# Patient Record
Sex: MALE | Race: BLACK OR AFRICAN AMERICAN | URBAN - METROPOLITAN AREA
[De-identification: names, ages, dates, MRNs, and addresses within clinical notes are randomized per-mention and may not be internally consistent; named-entity substitution may affect disease eponyms.]

---

## 2018-11-29 ENCOUNTER — HISTORICAL (OUTPATIENT)
Dept: ADMINISTRATIVE | Facility: HOSPITAL | Age: 83
End: 2018-11-29

## 2018-12-05 LAB
FINAL CULTURE: NORMAL

## 2020-02-28 ENCOUNTER — HISTORICAL (OUTPATIENT)
Dept: ADMINISTRATIVE | Facility: HOSPITAL | Age: 85
End: 2020-02-28

## 2020-03-01 LAB — FINAL CULTURE: NORMAL

## 2020-06-05 ENCOUNTER — HISTORICAL (OUTPATIENT)
Dept: ADMINISTRATIVE | Facility: HOSPITAL | Age: 85
End: 2020-06-05

## 2020-06-08 LAB — FINAL CULTURE: NORMAL

## 2022-04-07 ENCOUNTER — HISTORICAL (OUTPATIENT)
Dept: ADMINISTRATIVE | Facility: HOSPITAL | Age: 87
End: 2022-04-07

## 2022-04-24 VITALS — DIASTOLIC BLOOD PRESSURE: 97 MMHG | SYSTOLIC BLOOD PRESSURE: 181 MMHG

## 2025-03-18 ENCOUNTER — HOSPITAL ENCOUNTER (INPATIENT)
Facility: HOSPITAL | Age: 89
LOS: 7 days | Discharge: HOSPICE | DRG: 065 | End: 2025-03-25
Attending: EMERGENCY MEDICINE | Admitting: INTERNAL MEDICINE
Payer: MEDICARE

## 2025-03-18 DIAGNOSIS — I16.0 HYPERTENSIVE URGENCY: ICD-10-CM

## 2025-03-18 DIAGNOSIS — F05 DELIRIUM SUPERIMPOSED ON DEMENTIA: ICD-10-CM

## 2025-03-18 DIAGNOSIS — I61.9 INTRAPARENCHYMAL HEMORRHAGE OF BRAIN: ICD-10-CM

## 2025-03-18 DIAGNOSIS — R53.1 GENERALIZED WEAKNESS: ICD-10-CM

## 2025-03-18 DIAGNOSIS — I67.4 HYPERTENSIVE ENCEPHALOPATHY: Primary | ICD-10-CM

## 2025-03-18 PROCEDURE — 96374 THER/PROPH/DIAG INJ IV PUSH: CPT

## 2025-03-18 PROCEDURE — 25000003 PHARM REV CODE 250: Performed by: INTERNAL MEDICINE

## 2025-03-18 PROCEDURE — 99285 EMERGENCY DEPT VISIT HI MDM: CPT | Mod: 25

## 2025-03-18 PROCEDURE — 11000001 HC ACUTE MED/SURG PRIVATE ROOM

## 2025-03-18 PROCEDURE — 63600175 PHARM REV CODE 636 W HCPCS: Performed by: EMERGENCY MEDICINE

## 2025-03-18 RX ORDER — SODIUM CHLORIDE 9 MG/ML
INJECTION, SOLUTION INTRAVENOUS CONTINUOUS
Status: DISCONTINUED | OUTPATIENT
Start: 2025-03-18 | End: 2025-03-21

## 2025-03-18 RX ORDER — NICARDIPINE HYDROCHLORIDE 0.2 MG/ML
0-15 INJECTION INTRAVENOUS CONTINUOUS
Status: DISCONTINUED | OUTPATIENT
Start: 2025-03-18 | End: 2025-03-21 | Stop reason: HOSPADM

## 2025-03-18 RX ORDER — LABETALOL HYDROCHLORIDE 5 MG/ML
10 INJECTION, SOLUTION INTRAVENOUS
Status: COMPLETED | OUTPATIENT
Start: 2025-03-18 | End: 2025-03-18

## 2025-03-18 RX ADMIN — SODIUM CHLORIDE: 9 INJECTION, SOLUTION INTRAVENOUS at 11:03

## 2025-03-18 RX ADMIN — NICARDIPINE HYDROCHLORIDE 2.5 MG/HR: 0.2 INJECTION, SOLUTION INTRAVENOUS at 11:03

## 2025-03-18 RX ADMIN — LABETALOL HYDROCHLORIDE 10 MG: 5 INJECTION, SOLUTION INTRAVENOUS at 10:03

## 2025-03-18 NOTE — Clinical Note
Diagnosis: Hypertensive encephalopathy [437.2.ICD-9-CM]   Future Attending Provider: ANEUDY VERDUZCO JR. [[id number]]   Admit to which facility:: [facility] [[id number]]   Reason for IP Medical Treatment  (Clinical interventions that can only be accomplished in the IP setting? ) :: IV BP control   Plans for Post-Acute care--if anticipated (pick the single best option):: A. No post acute care anticipated at this time

## 2025-03-19 LAB
ALBUMIN SERPL-MCNC: 3.5 G/DL (ref 3.4–4.8)
ALBUMIN/GLOB SERPL: 1.1 RATIO (ref 1.1–2)
ALP SERPL-CCNC: 78 UNIT/L (ref 40–150)
ALT SERPL-CCNC: 11 UNIT/L (ref 0–55)
ANION GAP SERPL CALC-SCNC: 16 MEQ/L
APTT PPP: 26.3 SECONDS (ref 23.2–33.7)
AST SERPL-CCNC: 36 UNIT/L (ref 5–34)
BASOPHILS # BLD AUTO: 0.03 X10(3)/MCL
BASOPHILS NFR BLD AUTO: 0.4 %
BILIRUB SERPL-MCNC: 1.1 MG/DL
BUN SERPL-MCNC: 13 MG/DL (ref 8.4–25.7)
CALCIUM SERPL-MCNC: 8.5 MG/DL (ref 8.8–10)
CHLORIDE SERPL-SCNC: 107 MMOL/L (ref 98–107)
CO2 SERPL-SCNC: 19 MMOL/L (ref 23–31)
CREAT SERPL-MCNC: 0.86 MG/DL (ref 0.72–1.25)
CREAT/UREA NIT SERPL: 15
CRP SERPL-MCNC: 1.2 MG/L
EOSINOPHIL # BLD AUTO: 0 X10(3)/MCL (ref 0–0.9)
EOSINOPHIL NFR BLD AUTO: 0 %
ERYTHROCYTE [DISTWIDTH] IN BLOOD BY AUTOMATED COUNT: 15.8 % (ref 11.5–17)
ERYTHROCYTE [SEDIMENTATION RATE] IN BLOOD: 20 MM/HR (ref 0–20)
GFR SERPLBLD CREATININE-BSD FMLA CKD-EPI: >60 ML/MIN/1.73/M2
GLOBULIN SER-MCNC: 3.2 GM/DL (ref 2.4–3.5)
GLUCOSE SERPL-MCNC: 81 MG/DL (ref 82–115)
HCT VFR BLD AUTO: 41.6 % (ref 42–52)
HGB BLD-MCNC: 13.3 G/DL (ref 14–18)
HIV 1+2 AB+HIV1 P24 AG SERPL QL IA: NONREACTIVE
IMM GRANULOCYTES # BLD AUTO: 0.01 X10(3)/MCL (ref 0–0.04)
IMM GRANULOCYTES NFR BLD AUTO: 0.1 %
INR PPP: 1.1
LYMPHOCYTES # BLD AUTO: 0.7 X10(3)/MCL (ref 0.6–4.6)
LYMPHOCYTES NFR BLD AUTO: 10.3 %
MAGNESIUM SERPL-MCNC: 2 MG/DL (ref 1.6–2.6)
MCH RBC QN AUTO: 29.6 PG (ref 27–31)
MCHC RBC AUTO-ENTMCNC: 32 G/DL (ref 33–36)
MCV RBC AUTO: 92.7 FL (ref 80–94)
MONOCYTES # BLD AUTO: 0.31 X10(3)/MCL (ref 0.1–1.3)
MONOCYTES NFR BLD AUTO: 4.6 %
NEUTROPHILS # BLD AUTO: 5.72 X10(3)/MCL (ref 2.1–9.2)
NEUTROPHILS NFR BLD AUTO: 84.6 %
NRBC BLD AUTO-RTO: 0 %
OHS QRS DURATION: 76 MS
OHS QTC CALCULATION: 477 MS
PHOSPHATE SERPL-MCNC: 2.2 MG/DL (ref 2.3–4.7)
PLATELET # BLD AUTO: 123 X10(3)/MCL (ref 130–400)
PMV BLD AUTO: 12.7 FL (ref 7.4–10.4)
POTASSIUM SERPL-SCNC: 3.5 MMOL/L (ref 3.5–5.1)
PROT SERPL-MCNC: 6.7 GM/DL (ref 5.8–7.6)
PROTHROMBIN TIME: 14 SECONDS (ref 12.5–14.5)
RBC # BLD AUTO: 4.49 X10(6)/MCL (ref 4.7–6.1)
SODIUM SERPL-SCNC: 142 MMOL/L (ref 136–145)
T PALLIDUM AB SER QL: NONREACTIVE
TSH SERPL-ACNC: 3.4 UIU/ML (ref 0.35–4.94)
WBC # BLD AUTO: 6.77 X10(3)/MCL (ref 4.5–11.5)

## 2025-03-19 PROCEDURE — 83735 ASSAY OF MAGNESIUM: CPT

## 2025-03-19 PROCEDURE — 87389 HIV-1 AG W/HIV-1&-2 AB AG IA: CPT

## 2025-03-19 PROCEDURE — 85730 THROMBOPLASTIN TIME PARTIAL: CPT

## 2025-03-19 PROCEDURE — 25000003 PHARM REV CODE 250

## 2025-03-19 PROCEDURE — 36415 COLL VENOUS BLD VENIPUNCTURE: CPT

## 2025-03-19 PROCEDURE — 84443 ASSAY THYROID STIM HORMONE: CPT

## 2025-03-19 PROCEDURE — 11000001 HC ACUTE MED/SURG PRIVATE ROOM

## 2025-03-19 PROCEDURE — 20000000 HC ICU ROOM

## 2025-03-19 PROCEDURE — 86036 ANCA SCREEN EACH ANTIBODY: CPT

## 2025-03-19 PROCEDURE — 93010 ELECTROCARDIOGRAM REPORT: CPT | Mod: ,,, | Performed by: INTERNAL MEDICINE

## 2025-03-19 PROCEDURE — 63600175 PHARM REV CODE 636 W HCPCS

## 2025-03-19 PROCEDURE — 93005 ELECTROCARDIOGRAM TRACING: CPT

## 2025-03-19 PROCEDURE — 86780 TREPONEMA PALLIDUM: CPT

## 2025-03-19 PROCEDURE — 86140 C-REACTIVE PROTEIN: CPT

## 2025-03-19 PROCEDURE — 86039 ANTINUCLEAR ANTIBODIES (ANA): CPT

## 2025-03-19 PROCEDURE — 85652 RBC SED RATE AUTOMATED: CPT

## 2025-03-19 PROCEDURE — 63600175 PHARM REV CODE 636 W HCPCS: Performed by: EMERGENCY MEDICINE

## 2025-03-19 PROCEDURE — 25000003 PHARM REV CODE 250: Performed by: INTERNAL MEDICINE

## 2025-03-19 PROCEDURE — 80053 COMPREHEN METABOLIC PANEL: CPT

## 2025-03-19 PROCEDURE — 85610 PROTHROMBIN TIME: CPT

## 2025-03-19 PROCEDURE — 85025 COMPLETE CBC W/AUTO DIFF WBC: CPT

## 2025-03-19 PROCEDURE — 84100 ASSAY OF PHOSPHORUS: CPT

## 2025-03-19 RX ORDER — POTASSIUM CHLORIDE 20 MEQ/1
20 TABLET, EXTENDED RELEASE ORAL DAILY
COMMUNITY
Start: 2025-01-22

## 2025-03-19 RX ORDER — LORATADINE 10 MG/1
1 TABLET ORAL EVERY MORNING
COMMUNITY

## 2025-03-19 RX ORDER — MUPIROCIN 20 MG/G
OINTMENT TOPICAL 2 TIMES DAILY
Status: COMPLETED | OUTPATIENT
Start: 2025-03-19 | End: 2025-03-23

## 2025-03-19 RX ORDER — HYDROCODONE BITARTRATE AND ACETAMINOPHEN 10; 325 MG/1; MG/1
1 TABLET ORAL 3 TIMES DAILY PRN
COMMUNITY
Start: 2024-09-23 | End: 2025-03-26

## 2025-03-19 RX ORDER — LEVETIRACETAM 10 MG/ML
1000 INJECTION INTRAVASCULAR ONCE
Status: COMPLETED | OUTPATIENT
Start: 2025-03-19 | End: 2025-03-19

## 2025-03-19 RX ORDER — LEVOTHYROXINE SODIUM 50 UG/1
50 TABLET ORAL
COMMUNITY
Start: 2025-02-18

## 2025-03-19 RX ORDER — CLOPIDOGREL BISULFATE 75 MG/1
75 TABLET ORAL DAILY
Status: ON HOLD | COMMUNITY
End: 2025-03-25 | Stop reason: HOSPADM

## 2025-03-19 RX ORDER — DONEPEZIL HYDROCHLORIDE 10 MG/1
10 TABLET, FILM COATED ORAL NIGHTLY
COMMUNITY
Start: 2025-02-24

## 2025-03-19 RX ORDER — ATORVASTATIN CALCIUM 80 MG/1
80 TABLET, FILM COATED ORAL NIGHTLY
COMMUNITY

## 2025-03-19 RX ORDER — FUROSEMIDE 40 MG/1
40 TABLET ORAL DAILY
Status: ON HOLD | COMMUNITY
Start: 2025-02-18 | End: 2025-03-25 | Stop reason: HOSPADM

## 2025-03-19 RX ORDER — LABETALOL HYDROCHLORIDE 5 MG/ML
10 INJECTION, SOLUTION INTRAVENOUS EVERY 4 HOURS PRN
Status: DISCONTINUED | OUTPATIENT
Start: 2025-03-19 | End: 2025-03-25 | Stop reason: HOSPADM

## 2025-03-19 RX ORDER — MONTELUKAST SODIUM 10 MG/1
10 TABLET ORAL DAILY
COMMUNITY
Start: 2025-01-22

## 2025-03-19 RX ORDER — HYDRALAZINE HYDROCHLORIDE 20 MG/ML
10 INJECTION INTRAMUSCULAR; INTRAVENOUS EVERY 4 HOURS PRN
Status: DISCONTINUED | OUTPATIENT
Start: 2025-03-19 | End: 2025-03-25 | Stop reason: HOSPADM

## 2025-03-19 RX ORDER — TERAZOSIN 10 MG/1
10 CAPSULE ORAL DAILY
COMMUNITY
Start: 2025-01-27

## 2025-03-19 RX ORDER — SODIUM CHLORIDE 0.9 % (FLUSH) 0.9 %
10 SYRINGE (ML) INJECTION
Status: DISCONTINUED | OUTPATIENT
Start: 2025-03-19 | End: 2025-03-25 | Stop reason: HOSPADM

## 2025-03-19 RX ORDER — AMLODIPINE BESYLATE 5 MG/1
5 TABLET ORAL DAILY
COMMUNITY
Start: 2024-11-01

## 2025-03-19 RX ORDER — FERROUS SULFATE 325(65) MG
325 TABLET ORAL DAILY
COMMUNITY
Start: 2025-01-22

## 2025-03-19 RX ORDER — QUETIAPINE FUMARATE 50 MG/1
50 TABLET, FILM COATED ORAL 2 TIMES DAILY
COMMUNITY
Start: 2025-02-11

## 2025-03-19 RX ORDER — CITALOPRAM 10 MG/1
10 TABLET ORAL EVERY MORNING
COMMUNITY
Start: 2025-02-18

## 2025-03-19 RX ORDER — METOPROLOL TARTRATE 50 MG/1
50 TABLET ORAL 2 TIMES DAILY
Status: ON HOLD | COMMUNITY
End: 2025-03-25 | Stop reason: HOSPADM

## 2025-03-19 RX ADMIN — MUPIROCIN: 20 OINTMENT TOPICAL at 08:03

## 2025-03-19 RX ADMIN — HYDRALAZINE HYDROCHLORIDE 10 MG: 20 INJECTION INTRAMUSCULAR; INTRAVENOUS at 12:03

## 2025-03-19 RX ADMIN — HYDRALAZINE HYDROCHLORIDE 10 MG: 20 INJECTION INTRAMUSCULAR; INTRAVENOUS at 08:03

## 2025-03-19 RX ADMIN — LABETALOL HYDROCHLORIDE 10 MG: 5 INJECTION, SOLUTION INTRAVENOUS at 02:03

## 2025-03-19 RX ADMIN — MUPIROCIN: 20 OINTMENT TOPICAL at 09:03

## 2025-03-19 RX ADMIN — LEVETIRACETAM 500 MG: 100 INJECTION, SOLUTION INTRAVENOUS at 02:03

## 2025-03-19 RX ADMIN — NICARDIPINE HYDROCHLORIDE 5 MG/HR: 0.2 INJECTION, SOLUTION INTRAVENOUS at 07:03

## 2025-03-19 RX ADMIN — LEVETIRACETAM INJECTION 1000 MG: 10 INJECTION INTRAVENOUS at 01:03

## 2025-03-19 NOTE — CONSULTS
[facility] - 7 South ICU  Neurosurgery  Consult Note    Inpatient consult to Neurosurgery  Consult performed by: Akanksha Goyal AGACNP-BC  Consult ordered by: Cindy Lozada MD        Subjective:     Chief Complaint/Reason for Admission:  Transfer from outlying facility for generalized weakness, slurred speech.    History of Present Illness:  This is an 81-year-old male who [facility], slurred speech in the setting of dementia.  Patient was hypertensive upon initial presentation to 30s over 150s.  The patient is a DNR currently.    CT head without IV contrast obtained -  Generalized atrophy.  Chronic white matter small-vessel ischemic changes.    Development of a right parietal cortical 6.1 mm hyperdense area.  This may be calcification or cortical hemorrhage.        On physical exam the patient is in a demented state.  Pulling at lines.  Mumbling words.  Spontaneous moving all extremities.  The patient does not follow any commands.  PERRLA bilateral brisk.  The extent of the exam is limited given history of dementia.    No medications prior to admission.       Review of patient's allergies indicates:   Allergen Reactions    Penicillins        No past medical history on file.  No past surgical history on file.  Family History    None       Tobacco Use    Smoking status: Not on file    Smokeless tobacco: Not on file   Substance and Sexual Activity    Alcohol use: Not on file    Drug use: Not on file    Sexual activity: Not on file     Review of Systems  Objective:     Weight: 81.6 kg (180 lb)  Body mass index is 24.41 kg/m².  Vital Signs (Most Recent):  Temp: 98.6 °F (37 °C) (03/19/25 0800)  Pulse: 70 (03/19/25 0900)  Resp: 16 (03/19/25 0900)  BP: (!) 122/44 (03/19/25 0900)  SpO2: 97 % (03/19/25 0900) Vital Signs (24h Range):  Temp:  [97.7 °F (36.5 °C)-98.6 °F (37 °C)] 98.6 °F (37 °C)  Pulse:  [] 70  Resp:  [12-25]  16  SpO2:  [92 %-100 %] 97 %  BP: (116-210)/() 122/44                              Neurosurgery Physical Exam    Significant Labs:  Recent Labs   Lab 03/19/25  0040      K 3.5      CO2 19*   BUN 13.0   CREATININE 0.86   CALCIUM 8.5*   MG 2.00     Recent Labs   Lab 03/19/25  0041   WBC 6.77   HGB 13.3*   HCT 41.6*   *     Recent Labs   Lab 03/19/25  0040   INR 1.1   APTT 26.3     Microbiology Results (last 7 days)       ** No results found for the last 168 hours. **              Assessment/Plan:    Baseline demented state  Patient with increased weakness per family/speech issues  CT with right parietal cortical hyperdense area maybe a calcification or cortical hemorrhage.      Hourly neurological exams-can now be every 2 hour  Seizure precautions Keppra  Blood pressure less than 140/90   Stroke neurology consult   Fall precautions   SCDs   PTOT ST  Repeat CT head without contrast  Avoid anticoagulation  Continue workup         There are no hospital problems to display for this patient.      Thank you for your consult. I will follow-up with patient. Please contact us if you have any additional questions.    Akanksha Goyal, [facility]-BC  Neurosurgery  [facility] - [address] [place] [address] ICU

## 2025-03-19 NOTE — PLAN OF CARE
Problem: Adult Inpatient Plan of Care  Goal: Plan of Care Review  Outcome: Progressing  Goal: Patient-Specific Goal (Individualized)  Outcome: Progressing  Goal: Absence of Hospital-Acquired Illness or Injury  Outcome: Progressing  Goal: Optimal Comfort and Wellbeing  Outcome: Progressing  Goal: Readiness for Transition of Care  Outcome: Progressing     Problem: Skin Injury Risk Increased  Goal: Skin Health and Integrity  Outcome: Progressing     Problem: Fall Injury Risk  Goal: Absence of Fall and Fall-Related Injury  Outcome: Progressing     Problem: Delirium  Goal: Optimal Coping  Outcome: Progressing  Goal: Improved Behavioral Control  Outcome: Progressing  Goal: Improved Attention and Thought Clarity  Outcome: Progressing  Goal: Improved Sleep  Outcome: Progressing     Problem: Coping Ineffective  Goal: Effective Coping  Outcome: Progressing

## 2025-03-19 NOTE — PLAN OF CARE
03/19/25 1645   Discharge Assessment   Assessment Type Discharge Planning Assessment   Confirmed/corrected address, phone number and insurance Yes   Confirmed Demographics Correct on Facesheet  (patient's physical address is : [address] [place] [phone])   Source of Information family  (son Daniel Rose)   If unable to respond/provide information was family/caregiver contacted? Yes   Contact Name/Number Daniel Rose (son) [phone]   When was your last doctors appointment?   (Magdi Walton MD is PCP)   Communicated NISA with patient/caregiver Date not available/Unable to determine   Reason For Admission Hypertensive Encephalopathy   People in Home spouse;child(priscila), adult  (Lives with Spouse Elle and son Daniel)   Facility Arrived From: [facility] for Neurosurgical intervention   Do you expect to return to your current living situation? Yes   Do you have help at home or someone to help you manage your care at home? No   Prior to hospitilization cognitive status: Unable to Assess   Current cognitive status:   (alert but not oriented)   Walking or Climbing Stairs Difficulty yes   Walking or Climbing Stairs ambulation difficulty, requires equipment   Mobility Management uses cane to ambulate but with Dementia sometimes walks without cane   Dressing/Bathing Difficulty   (performs ADL self but when this episode occurred needed help)   Home Layout Able to live on 1st floor   Equipment Currently Used at Home cane, straight   Readmission within 30 days? No   Patient currently being followed by outpatient case management? No   Do you currently have service(s) that help you manage your care at home? No   Do you take prescription medications? Yes   Do you have prescription coverage? Yes   Coverage [employer] Medicare   Do you have any problems affording any of your prescribed medications? No   Is the patient taking medications as prescribed? yes   Who is going to help you get home at discharge? rufina  Daniel   How do you get to doctors appointments? family or friend will provide   Are you on dialysis? No   Do you take coumadin? No   Discharge Plan A Home;Home with family;Hospice/home   Discharge Plan B Other   DME Needed Upon Discharge  none   Discharge Plan discussed with: Patient   Transition of Care Barriers None   Physical Activity   On average, how many days per week do you engage in moderate to strenuous exercise (like a brisk walk)? 0 days   On average, how many minutes do you engage in exercise at this level? 0 min   Financial Resource Strain   How hard is it for you to pay for the very basics like food, housing, medical care, and heating? Not hard   Housing Stability   In the last 12 months, was there a time when you were not able to pay the mortgage or rent on time? N   At any time in the past 12 months, were you homeless or living in a shelter (including now)? N   Transportation Needs   Has the lack of transportation kept you from medical appointments, meetings, work or from getting things needed for daily living? No   Food Insecurity   Within the past 12 months, you worried that your food would run out before you got the money to buy more. Never true   Within the past 12 months, the food you bought just didn't last and you didn't have money to get more. Never true   Stress   Do you feel stress - tense, restless, nervous, or anxious, or unable to sleep at night because your mind is troubled all the time - these days? Not at all   Social Isolation   How often do you feel lonely or isolated from those around you?  Never   Alcohol Use   Q1: How often do you have a drink containing alcohol? Never   Q2: How many drinks containing alcohol do you have on a typical day when you are drinking? None   Q3: How often do you have six or more drinks on one occasion? Never   Sustainability Roundtableities   In the past 12 months has the electric, gas, oil, or water company threatened to shut off services in your home? No   Health  Literacy   How often do you need to have someone help you when you read instructions, pamphlets, or other written material from your doctor or pharmacy? Never   OTHER   Name(s) of People in Home Elle Rose spouse and Daniel Rose

## 2025-03-19 NOTE — CONSULTS
Patient Name: Kenney Rose   MRN: [id number]   Admission Date: 3/18/2025   Hospital Length of Stay: 1   Attending Provider: Hayley Putnam MD   Consulting Provider: Mesha WASHINGTON  Reason for Consult: Goals of Care  Primary Care Physician: Magdi Walton MD     Principal Problem: <principal problem not specified>     Patient information was obtained from relative(s) and ER records.      Final diagnoses:  [I67.4] Hypertensive encephalopathy (Primary)  [R53.1] Generalized weakness  [I61.9] Intraparenchymal hemorrhage of brain - Possible  [F05] Delirium superimposed on dementia     Assessment/Plan:       Called and spoke to patients son, Daniel, introduced services. Patient lives at home with his wife and Daniel who is his caregiver. He has 8 children. He has a DNR that has been in place prior to admission.     We discussed his poor prognosis. Daniel is aware that he prognosis is poor and that he will need more assistance at home. He is in agreement with hospice. He is familiar with [facility] care. I told him I wasn't sure if they had hospice services but I will reach out to case management. I answered all questions and offered support.    I reviewed the patient and family's understanding of the seriousness of the illness and its expected prognosis. We discussed the patient's goals of care and treatment preferences.  I clarified current code status. I identified the surrogate decision maker or health care POA.  I answered all questions and we formulated a plan including recommendations for symptom management and how to best achieve goals of care.  Advance Care Planning     Date: 03/19/2025        [facility]  I engaged the family in a voluntary conversation about advance care planning and we specifically addressed what the goals of care would be moving forward, in light of the patient's change in clinical status, specifically current condition.  We did specifically address the patient's likely prognosis, which is  poor.  We explored the patient's values and preferences for future care.  The family endorses that what is most important right now is to focus on comfort and QOL     Accordingly, we have decided that the best plan to meet the patient's goals includes enrolling in hospice care  This discussion occurred on a fully voluntary basis with the verbal consent of the patient and/or family.        Hospice  I did explain the role for hospice care at this stage of the patient's illness, including its ability to help the patient live with the best quality of life possible.  We willbe making a hospice referral.              Recommendations:     Consult to case management for hospice referral.      History of Present Illness:     This is an 81 year old male with a history of dementia who presented to the ED from [facility] for generalized weakness, slurred speech. Patient was hypertensive upon arrival. He is a DNR. CT of head with stable trace scattered subarachnoid hemorrhage over the right cerebral hemisphere. Palliative medicine consulted for goals of care discussion.       Active Ambulatory Problems     Diagnosis Date Noted    No Active Ambulatory Problems     Resolved Ambulatory Problems     Diagnosis Date Noted    No Resolved Ambulatory Problems     No Additional Past Medical History        No past surgical history on file.     Review of patient's allergies indicates:   Allergen Reactions    Penicillins         Current Medications[1]       Current Facility-Administered Medications:     hydrALAZINE, 10 mg, Intravenous, Q4H PRN    labetalol, 10 mg, Intravenous, Q4H PRN    sodium chloride 0.9%, 10 mL, Intravenous, PRN     No family history on file.     Review of Systems   Unable to perform ROS: Acuity of condition            Objective:   BP (!) 152/69   Pulse 69   Temp 98.6 °F (37 °C) (Oral)   Resp 16   Ht 6' (1.829 m)   Wt 81.6 kg (180 lb)   SpO2 97%   BMI 24.41 kg/m²      Physical Exam  Constitutional:       Appearance: He  is ill-appearing.   HENT:      Head: Normocephalic and atraumatic.   Cardiovascular:      Rate and Rhythm: Normal rate.   Pulmonary:      Effort: Pulmonary effort is normal.   Neurological:      Mental Status: He is lethargic and disoriented.      Motor: Weakness present.   Psychiatric:         Cognition and Memory: Cognition is impaired. Memory is impaired.         Judgment: Judgment is impulsive.             Review of Symptoms      Symptom Assessment (ESAS 0-10 Scale)  Pain:  0  Dyspnea:  0  Anxiety:  0  Nausea:  0  Depression:  0  Anorexia:  0  Fatigue:  0  Insomnia:  0  Restlessness:  0  Agitation:  0         Performance Status:  30    Living Arrangements:  Lives with family    Psychosocial/Cultural:   See Palliative Psychosocial Note: Yes  ; 8 children. Lives with wife and son  **Primary  to Follow**  Palliative Care  Consult: No      Advance Care Planning   Advance Directives:   Do Not Resuscitate Status: Yes      Decision Making:  Family answered questions  Goals of Care: The family endorses that what is most important right now is to focus on comfort and QOL     Accordingly, we have decided that the best plan to meet the patient's goals includes enrolling in hospice care          PAINAD: NA    Caregiver burden formerly assessed: Yes        > 50% of 60 min of encounter was spent in chart review, face to face discussion of goals of care, symptom assessment, coordination of care and emotional support.         Mesha Molina, FELIX  Palliative Medicine  [facility]           [1]   Current Facility-Administered Medications:     0.9% NaCl infusion, , Intravenous, Continuous, Nathaniel Ernandez Jr., MD, [facility], Last Rate: 10 mL/hr at 03/19/25 1012, Rate Verify at 03/19/25 1012    hydrALAZINE injection 10 mg, 10 mg, Intravenous, Q4H PRN, Ruben Joshi DO, 10 mg at 03/19/25 1220    labetaloL injection 10 mg, 10 mg, Intravenous, Q4H PRN, Ruben Joshi DO    levETIRAcetam  (Keppra) 500 mg in D5W 100 mL IVPB (MB+), 500 mg, Intravenous, Q12H, Janneth Howard MD    mupirocin 2 % ointment, , Nasal, BID, Nathaniel Ernandez Jr., MD, FCCP, Given at 03/19/25 0900    niCARdipine 40 mg/200 mL (0.2 mg/mL) infusion, 0-15 mg/hr, Intravenous, Continuous, Cindy Lozada MD, Stopped at 03/19/25 1007    sodium chloride 0.9% flush 10 mL, 10 mL, Intravenous, PRN, Ruben Joshi DO

## 2025-03-19 NOTE — H&P
[facility] - Emergency Dept  Pulmonary Critical Care Note    Patient Name: Kenney Rose  MRN: [id number]  Admission Date: 3/18/2025  Hospital Length of Stay: 1 days  Code Status: DNR  Attending Provider: Nathaniel Ernandez Jr., MD,*  Resident: Lee  Primary Care Provider: Magdi Walton MD     Subjective:     HPI:   Kenney Rose is a 88 yo male w PMHx of non-verbal dementia, HTN, HLD, PVD, and CAD s/p CABG x 1 on DAPT who was transferred from [facility] for possible cortical hemorrhage and neurosurgical services. Wife at bedside, states that patient would not want chest compressions in the event of cardiac arrest. Patient initially presented from his home to [facility] [place] and subsequently transferred to [place]. Reportedly increased generalized weakness and difficulty standing from seated position from his baseline as well as slurred speech since this morning when he woke up. Last known normal was 2100 yesterday. Hypertensive upon initial presentation BP 230s/150s, was given Hydralazine 20mg x 1. CT head wo contrast consistent with generalized atrophy, chronic appearing small vessel ischemic changes, and 6.1 mm R parietal hyperdense area labelled as calcification vs cortical hemorrhage. CT chest negative for pneumothorax.     Following transfer to [facility], BP noted to be in 140s, however all VSS otherwise and afebrile. GCS 13. Neurosurgery was paged, recommended SBP goal <160 as unsure if this is a true hemorrhage. Patient was loaded on Keppra and given Labetalol 10mg x 1 in ED. Admitted to ICU for close monitoring.     Hospital Course/Significant events:  Transferred from [facility] - 3/18/25     24 Hour Interval History:  NA    No past medical history on file.    No past surgical history on file.    Social History[1]        No current outpatient medications    Review of patient's allergies indicates:   Allergen Reactions    Penicillins         Current Inpatient  Medications      Current Intravenous Infusions   0.9% NaCl   Intravenous Continuous 30 mL/hr at 03/18/25 2358 Rate Verify at 03/18/25 2358    nicardipine  0-15 mg/hr Intravenous Continuous 37.5 mL/hr at 03/18/25 2358 7.5 mg/hr at 03/18/25 2358         Review of Systems   Unable to perform ROS: Dementia      Nonverbal at baseilne    Objective:       Intake/Output Summary (Last 24 hours) at 3/19/2025 0047  Last data filed at 3/18/2025 2358  Gross per 24 hour   Intake 10.57 ml   Output --   Net 10.57 ml         Vital Signs (Most Recent):  Temp: 97.9 °F (36.6 °C) (03/18/25 2153)  Pulse: 104 (03/19/25 0015)  Resp: 17 (03/19/25 0015)  BP: (!) 141/88 (03/19/25 0015)  SpO2: 96 % (03/19/25 0015)  Body mass index is 24.41 kg/m².  Weight: 81.6 kg (180 lb) Vital Signs (24h Range):  Temp:  [97.9 °F (36.6 °C)-98.6 °F (37 °C)] 97.9 °F (36.6 °C)  Pulse:  [] 104  Resp:  [13-20] 17  SpO2:  [96 %-100 %] 96 %  BP: (125-210)/() 141/88     Physical Exam  Vitals and nursing note reviewed.   Constitutional:       General: He is awake. He is not in acute distress.     Appearance: He is normal weight. He is not toxic-appearing or diaphoretic.      Comments: Chronically ill appearing   HENT:      Head: Normocephalic and atraumatic.      Nose: Nose normal. No congestion or rhinorrhea.      Mouth/Throat:      Mouth: Mucous membranes are dry.      Pharynx: Oropharynx is clear.   Eyes:      General: No scleral icterus.     Pupils: Pupils are equal, round, and reactive to light.   Cardiovascular:      Rate and Rhythm: Regular rhythm. Tachycardia present.      Pulses: Normal pulses.      Heart sounds: Normal heart sounds. No murmur heard.  Pulmonary:      Effort: Pulmonary effort is normal. No respiratory distress.      Breath sounds: Normal breath sounds. No wheezing, rhonchi or rales.   Abdominal:      General: Abdomen is flat. There is no distension.      Palpations: Abdomen is soft.      Tenderness: There is no abdominal tenderness.  "  Musculoskeletal:         General: Normal range of motion.      Cervical back: Normal range of motion.      Right lower leg: No edema.      Left lower leg: No edema.   Lymphadenopathy:      Cervical: No cervical adenopathy.   Skin:     General: Skin is warm and dry.      Capillary Refill: Capillary refill takes less than 2 seconds.      Coloration: Skin is not jaundiced.      Findings: No bruising or lesion.   Neurological:      General: No focal deficit present.      Mental Status: He is alert. He is disoriented.      GCS: GCS eye subscore is 3. GCS verbal subscore is 3. GCS motor subscore is 5.      Motor: Weakness present. No abnormal muscle tone or seizure activity.      Comments: + Non-verbal at baseline   + Intermittently follows commands with stimulation   + Oriented to city where he lives but not self, time or current place   + Normal muscular tone   + Diffuse weakness, 4/5   + Spontaneous movement of all extremities   - Focal neurological deficit, seizure like activity  Limited 2/2 patient effort/baseline of dementia    Psychiatric:         Attention and Perception: He is inattentive. He does not perceive auditory or visual hallucinations.         Speech: He is noncommunicative.         Behavior: Behavior is agitated and combative.      Comments: + soft restraints in place   Limited 2/2 baseline of dementia            Lines/Drains/Airways       Peripheral Intravenous Line  Duration                  Peripheral IV - Single Lumen 18 G Posterior;Right Hand -- days         Peripheral IV - Single Lumen 03/18/25 2339 20 G Anterior;Left;Proximal Forearm <1 day                    Significant Labs:    No results found for: "WBC", "HGB", "HCT", "MCV", "PLT"        BMP  No results found for: "NA", "K", "CHLORIDE", "CO2", "BUN", "CREATININE", "CALCIUM", "AGAP", "ESTGFRAFRICA", "EGFRNONAA"      ABG  No results for input(s): "PH", "PO2", "PCO2", "HCO3", "POCBASEDEF" in the last 168 hours.    Mechanical Ventilation " Support:         Significant Imaging:  I have reviewed the pertinent imaging within the past 24 hours.    Imaging Results    None       No results found for this or any previous visit.     No echocardiogram results found for the past 14 days.       Assessment/Plan:     Assessment  6.1 mm R parietal hyperdense area - labelled as calcification vs cortical hemorrhage  Hypertensive Encephalopathy - BP on external facility arrival 230s/150s  Hx of non-verbal dementia, HTN, HLD, PVD, and CAD s/p CABG x 1 on DAPT       Plan  Admit to ICU for close monitoring   SpO2 goal 94-96%, does not require supplemental O2 at this time   Neurosurgery and vascular neurology consulted from ED, pending final recommendations   Q1hr neurochecks   SBP goal <160 due to hypertensive emergency vs true ICH per neurosurgery   Cardene drip with PRN antihypertensives in place   Bedrest and HOB elevated 30   Loaded with Keppra, continue with Keppra 500mg BID; seizure precautions in place   Avoid DAPT and AC for now due to potential ICH   PRN antiemetics in place   Continuous cardiac monitoring, daily weight, strict I&Os   NPO for now until patient is evaluated by neurosurgery   Fall, aspiration, and delirium precautions in place   Labs following transfer pending, external imaging pending upload for reivew    Code Status: DNR  Abx: None   IVF: None   DVT Prophylaxis: SCDs  GI Prophylaxis: None      32 minutes of critical care was time spent personally by me on the following activities: development of treatment plan with patient or surrogate and bedside caregivers, discussions with consultants, evaluation of patient's response to treatment, examination of patient, ordering and performing treatments and interventions, ordering and review of laboratory studies, ordering and review of radiographic studies, pulse oximetry, re-evaluation of patient's condition.  This critical care time did not overlap with that of any other provider or involve time for any  procedures.     Janneth Howard MD  Pulmonary Critical Care Medicine  [facility] - Emergency Dept  DOS: 03/19/2025           [1]   Social History  Socioeconomic History    Marital status:

## 2025-03-19 NOTE — PLAN OF CARE
"CM received a  message from ALOK Zimmer with Palliative care services that family wants to take patient home with hospice care. CM phoned patient's son , Daniel to discus this. While speaking with Daniel ,family does not want to bring patient home right now with hospice care. Daniel told CM they would like to see how patient does at the hospital. Daniel told CM depending on patient's condition, he is not sure they can take care of patient at home.Told CM " we might have to put him at a nursing home .Daniel is coming tomorrow to the hospital to visit his father ,CM will bring a list of hospice agencies and will talk further with him about hospice.   "

## 2025-03-19 NOTE — H&P
[facility] Hospital Medicine History & Physical Examination       Patient Name: Kenney Rose  MRN: [id number]  Patient Class: IP- Inpatient   Admission Date: 3/18/2025   Admitting Physician: PRAKASH Service   Length of Stay: 1  Attending Physician: Rossy Jacques MD  Primary Care Provider: Magdi Walton MD  Face-to-Face encounter date: 03/19/2025  Code Status:dnr  Chief Complaint: Fall (Pt arrives via [facility], EMS transfer from [facility] in [place] small pneumo , cortical Hemorrhage. )      Screening for Social Drivers for health:  Patient screened for food insecurity, housing instability, transportation needs, utility difficulties, and interpersonal safety (select all that apply as identified as concern)  []Housing or Food  []Transportation Needs  []Utility Difficulties  []Interpersonal safety  [x]None      Patient information was obtained from patient, patient's family, past medical records and ER records.  ED records were reviewed in detail and documented below    HISTORY OF PRESENT ILLNESS:   Kenney Rose is a 89 yo male w PMHx of non-verbal dementia, HTN, HLD, PVD, and CAD s/p CABG x 1 on DAPT who was transferred from [facility] for possible cortical hemorrhage and neurosurgical services. Wife at bedside, states that patient would not want chest compressions in the event of cardiac arrest. Patient initially presented from his home to [facility] in [place] and subsequently transferred to [place]. Reportedly increased generalized weakness and difficulty standing from seated position from his baseline as well as slurred speech since this morning when he woke up. Last known normal was 2100 yesterday. Hypertensive upon initial presentation BP 230s/150s, was given Hydralazine 20mg x 1. CT head wo contrast consistent with generalized atrophy, chronic appearing small vessel ischemic changes, and 6.1 mm R parietal hyperdense area labelled as calcification vs cortical  hemorrhage. CT chest negative for pneumothorax.      Following transfer to [facility], BP noted to be in 140s, however all VSS otherwise and afebrile. GCS 13. Neurosurgery was paged, recommended SBP goal <160 as unsure if this is a true hemorrhage. Patient was loaded on Keppra and given Labetalol 10mg x 1 in ED. Admitted to ICU for close monitoring. Seen by Palliative Medicine.    Being downgraded to Hospital medicine services during the evening hours of 3/19.  Blood pressure in 130s systolic/70s diastolic.  Afebrile.  Stable on room air.  Continued on Keppra.  Continued on Keppra.  Therapy evaluations pending.    PAST MEDICAL HISTORY:   As above in HPI    PAST SURGICAL HISTORY:   CABG    ALLERGIES:   Penicillins    FAMILY HISTORY:   Reviewed and negative    SOCIAL HISTORY:     Social History     Tobacco Use    Smoking status: Not on file    Smokeless tobacco: Not on file   Substance Use Topics    Alcohol use: Not on file        HOME MEDICATIONS:     Prior to Admission medications    Medication Sig Start Date End Date Taking? Authorizing Provider   amLODIPine (NORVASC) 5 MG tablet Take 5 mg by mouth once daily. 11/1/24  Yes Provider, Historical   citalopram (CELEXA) 10 MG tablet Take 10 mg by mouth every morning. 2/18/25  Yes Provider, Historical   donepeziL (ARICEPT) 10 MG tablet Take 10 mg by mouth every evening. 2/24/25  Yes Provider, Historical   FEROSUL 325 mg (65 mg iron) Tab tablet Take 325 mg by mouth once daily. 1/22/25  Yes Provider, Historical   furosemide (LASIX) 40 MG tablet Take 40 mg by mouth once daily. 2/18/25  Yes Provider, Historical   HYDROcodone-acetaminophen (NORCO)  mg per tablet Take 1 tablet by mouth 3 (three) times daily as needed. 9/23/24 3/26/25 Yes Provider, Historical   levothyroxine (SYNTHROID) 50 MCG tablet Take 50 mcg by mouth before breakfast. 2/18/25  Yes Provider, Historical   montelukast (SINGULAIR) 10 mg tablet Take 10 mg by mouth once daily. 1/22/25  Yes Provider, Historical    potassium chloride SA (K-DUR,KLOR-CON) 20 MEQ tablet Take 20 mEq by mouth once daily. 1/22/25  Yes Provider, Historical   QUEtiapine (SEROQUEL) 50 MG tablet Take 50 mg by mouth 2 (two) times daily. 2/11/25  Yes Provider, Historical   terazosin (HYTRIN) 10 MG capsule Take 10 mg by mouth once daily. 1/27/25  Yes Provider, Historical   atorvastatin (LIPITOR) 80 MG tablet Take 80 mg by mouth every evening.    Provider, Historical   clopidogreL (PLAVIX) 75 mg tablet Take 75 mg by mouth once daily.    Provider, Historical   loratadine (CLARITIN) 10 mg tablet Take 1 tablet by mouth every morning.    Provider, Historical   metoprolol tartrate (LOPRESSOR) 50 MG tablet Take 50 mg by mouth 2 (two) times daily.    Provider, Historical   rivaroxaban (XARELTO) 20 mg Tab Take 20 mg by mouth daily with dinner or evening meal.    Provider, Historical       REVIEW OF SYSTEMS:   Except as documented, all other systems reviewed and negative     PHYSICAL EXAM:     VITAL SIGNS: 24 HRS MIN & MAX LAST   Temp  Min: 97.7 °F (36.5 °C)  Max: 98.9 °F (37.2 °C) 98.4 °F (36.9 °C)   BP  Min: 97/42  Max: 210/172 135/71   Pulse  Min: 53  Max: 105  83   Resp  Min: 10  Max: 25 16   SpO2  Min: 92 %  Max: 100 % 96 %     General appearance:  Ill-appearing   HENT: Atraumatic head. Moist mucous membranes of oral cavity.  Eyes: Normal extraocular movements.   Neck: Supple.   Lungs: Clear   Heart:  S1 and S2 present ; normal rate  Abdomen: Soft, non-distended, non-tender.   Extremities: No cyanosis, clubbing, or edema.  Skin: No Rash.   Neuro:  Alert, nonverbal, extremity weakness noted, able to move spontaneously      LABS AND IMAGING:     Recent Labs   Lab 03/19/25  0041   WBC 6.77   RBC 4.49*   HGB 13.3*   HCT 41.6*   MCV 92.7   MCH 29.6   MCHC 32.0*   RDW 15.8   *   MPV 12.7*       Recent Labs   Lab 03/19/25  0040      K 3.5      CO2 19*   BUN 13.0   CREATININE 0.86   CALCIUM 8.5*   MG 2.00   ALBUMIN 3.5   ALKPHOS 78   ALT 11   AST  36*   BILITOT 1.1       Microbiology Results (last 7 days)       ** No results found for the last 168 hours. **             CT Head Without Contrast  Narrative: EXAMINATION:  CT HEAD WITHOUT CONTRAST    CLINICAL HISTORY:  cortical hemorrhage versus calcification;    TECHNIQUE:  Axial scans were obtained from skull base to the vertex.    Coronal and sagittal reconstructions obtained from the axial data.    Automatic exposure control was utilized to limit radiation dose.    Contrast: None    Radiation Dose:    Total DLP: 18 80 mGy*cm    COMPARISON:  Outside CT head dated 03/18/2025    FINDINGS:  There is stable trace scattered subarachnoid hemorrhage over the right cerebral hemisphere.  Patchy hypodensities in the subcortical and periventricular white matter likely represent chronic ischemic changes.    There is no significant mass effect or midline shift.  The basal cisterns are patent.  There is diffuse parenchymal volume loss.  The calvarium appears intact.  Impression: Stable trace scattered subarachnoid hemorrhage over the right cerebral hemisphere.    Electronically signed by: Xenia Linton  Date:    03/19/2025  Time:    10:43      ASSESSMENT & PLAN:   Intraparenchymal /subarachnoid hemorrhage  Hypertensive encephalopathy  Delirium superimposed on dementia  Mild thrombocytopenia    Plan   Hold any antiplatelets/anticoagulation  Continue neuro checks  Monitor blood pressure, goal less than 140 /90  Continue Keppra  Neurosurgery following, recommended stroke neurology consult, repeat CT head  Palliative medicine following  Await PTOT speech evaluation  Obtain med rec,plan to resume home medications upon clearance by speech    VTE Prophylaxis:  SCDs    Patient condition:  To be decided.  Possible hospice in next 24 hours  __________________________________________________________________________  INPATIENT LIST OF MEDICATIONS     Scheduled Meds:   levETIRAcetam (Keppra) IV (PEDS and ADULTS)  500 mg Intravenous  Q12H    mupirocin   Nasal BID     Continuous Infusions:   0.9% NaCl   Intravenous Continuous 10 mL/hr at 03/19/25 1626 Rate Verify at 03/19/25 1626    nicardipine  0-15 mg/hr Intravenous Continuous   Stopped at 03/19/25 1132     PRN Meds:.  Current Facility-Administered Medications:     hydrALAZINE, 10 mg, Intravenous, Q4H PRN    labetalol, 10 mg, Intravenous, Q4H PRN    sodium chloride 0.9%, 10 mL, Intravenous, PRN            Rossy Jacques MD   03/19/2025

## 2025-03-19 NOTE — ED PROVIDER NOTES
Encounter Date: 3/18/2025       History     Chief Complaint   Patient presents with    Fall     Pt arrives via [facility], EMS transfer from [facility] in [place] small pneumo , cortical Hemorrhage.      81-year-old male presents to the emergency department as transfer from [facility] for neurosurgical evaluation.  Presented there with generalized weakness, slurred speech since about 6:00 a.m. this morning.  Last known normal at 9:00 p.m. last night.  Reportedly when he woke this morning, was unable to stand up due to weakness.    Hypotensive on presentation.  Blood pressure is 2 30s over 150s.    Exam documented as 4/5 strength bilaterally, following commands inconsistently, nonverbal      CT head without IV contrast obtained approximately 7:15 tonight.  Generalized atrophy.  Chronic white matter small-vessel ischemic changes.    Development of a right parietal cortical 6.1 mm hyperdense area.  This may be calcification or cortical hemorrhage.      Prior to transfer, was given 20 mg IV hydralazine, Zofran 4 mg IV with improvement in blood pressures.    Sodium 140, potassium 4.3, chloride 110, CO2 22, BUN 14, creatinine 0.99   Calcium 9.0   Urinalysis no indication of urinary tract infection   WBC 6.2, hemoglobin 13.4, hematocrit 41.4, platelets 142   Lactic acid 1.6     Chest x-ray interpreted as possible small right pneumothorax.  Left lateral decubitus view recommended.    CT scan subsequently obtained demonstrated no pneumothorax or other acute intrathoracic abnormality based on this exam.        Review of patient's allergies indicates:   Allergen Reactions    Penicillins      No past medical history on file.  No past surgical history on file.  No family history on file.  Social History[1]  Review of Systems   Unable to perform ROS: Dementia       Physical Exam     Initial Vitals [03/18/25 2153]   BP Pulse Resp Temp SpO2   (!) 141/63 77 20 97.9 °F (36.6 °C) 100 %      MAP       --         Physical Exam    Nursing  note and vitals reviewed.  Constitutional: He appears well-developed and well-nourished.   HENT:   Head: Normocephalic and atraumatic.   No scalp contusion, laceration, or other evidence of trauma   Eyes: Pupils are equal, round, and reactive to light. No scleral icterus.   Neck:   No point vertebral tenderness, freely moving neck without apparent discomfort   Cardiovascular:  Normal rate and regular rhythm.           Pulmonary/Chest: No respiratory distress. He exhibits no tenderness.   Abdominal: Abdomen is soft. He exhibits no distension. There is no abdominal tenderness.     Neurological:   Alert, agitated, pulling at lines and tubes, moving all extremities with grossly symmetric strength, grabbing at staff, nonverbal   Skin: Skin is warm and dry.         ED Course   Critical Care    Date/Time: 3/18/2025 10:19 PM    Performed by: Cindy Lozada MD  Authorized by: Cindy Lozada MD  Direct patient critical care time: 16 minutes  Additional history critical care time: 4 minutes  Documentation critical care time: 4 minutes  Consulting other physicians critical care time: 8 minutes  Total critical care time (exclusive of procedural time) : 32 minutes  Critical care time was exclusive of separately billable procedures and treating other patients.  Critical care was necessary to treat or prevent imminent or life-threatening deterioration of the following conditions: CNS failure or compromise and circulatory failure.  Critical care was time spent personally by me on the following activities: development of treatment plan with patient or surrogate, discussions with consultants, interpretation of cardiac output measurements, evaluation of patient's response to treatment, examination of patient, obtaining history from patient or surrogate, ordering and performing treatments and interventions, pulse oximetry, re-evaluation of patient's condition and review of old charts.        Labs Reviewed   COMPREHENSIVE METABOLIC  PANEL   MAGNESIUM   PHOSPHORUS   PROTIME-INR   APTT   HIV 1 / 2 ANTIBODY   ANTI-NEUTROPHILIC CYTOPLASMIC ANTIBODY   LILIA IGG BY IFA   SYPHILIS ANTIBODY (WITH REFLEX RPR)   C-REACTIVE PROTEIN   SEDIMENTATION RATE   TSH          Imaging Results    None          Medications   niCARdipine 40 mg/200 mL (0.2 mg/mL) infusion (7.5 mg/hr Intravenous Verify Only 3/18/25 2358)   0.9% NaCl infusion ( Intravenous Verify Only 3/18/25 2358)   sodium chloride 0.9% flush 10 mL (has no administration in time range)   hydrALAZINE injection 10 mg (has no administration in time range)   labetaloL injection 10 mg (has no administration in time range)   mupirocin 2 % ointment (has no administration in time range)   labetaloL injection 10 mg (10 mg Intravenous Given 3/18/25 2227)     Medical Decision Making  Problems Addressed:  Delirium superimposed on dementia: acute illness or injury  Generalized weakness: acute illness or injury  Hypertensive encephalopathy: acute illness or injury that poses a threat to life or bodily functions  Intraparenchymal hemorrhage of brain: acute illness or injury that poses a threat to life or bodily functions    Risk  Prescription drug management.  Decision regarding hospitalization.      ED assessment:    Mr. Rose was transferred here for higher level of care, neurosurgical evaluation with concern for possible spontaneous intracranial hemorrhage in the setting of severe hypotension, generalized weakness reported with no focal neurologic deficits.  Initially concern for possible pneumothorax at previous facility however additional imaging with no evidence of acute cardiopulmonary process or acute evidence of thoracic trauma.  No reported falls or other injuries per family at the bedside.  Markedly hypertensive at previous facility, somewhat improved transiently with hydralazine however blood pressure once again significantly elevated on arrival here.  Given labetalol.  Case discussed with neurosurgery who  reviewed the imaging study, advised affected area minimal, do not anticipate surgical intervention however recommended admit to ICU, BP control on head CT in the morning.  Discussed with stroke Neurology as well, recommends maintain BP approx 160 systolic.  Discussed with ICU who accepts for admit    Differential diagnosis (including but not limited to):   Spontaneous intracranial hemorrhage, hypertensive hemorrhage, hypertensive encephalopathy, urinary tract infection, delirium superimposed on dementia, adverse medication effect      Amount and/or Complexity of Data Reviewed  Independent historian:  Family at the bedside   Summary of history:  Entirety of history provided by patient's family at the bedside  External data reviewed: transfer records, prior labs, and prior imaging  Summary of data reviewed:  As summarized in HPI above    Discussion of management or test interpretation with external provider(s): discussed with Neurosurgery, stroke Neurology, critical care medicine consultant   Summary of discussion:  As above    Risk  Prescription drug management   Drug therapy requiring intense monitoring for toxicity   Decision regarding hospitalization  Shared decision making     Critical Care  30-74 minutes     ICindy MD personally performed the history, PE, MDM, and procedures as documented above and agree with the scribe's documentation.              ED Course as of 03/19/25 0057   Tue Mar 18, 2025   2306 Discussed with Neurosurgery, recommended admit ICU for BP control, repeat head CT in a.m..  No evidence of traumatic injury.  Family at bedside denies any recent fall or other head injury.  Will discuss with stroke Neurology to discuss BP parameters [KS]   2312 Discussed with Stroke neurology, Dr. Rodriguez [KS]      ED Course User Index  [KS] Cindy Lozada MD                           Clinical Impression:  Final diagnoses:  [I67.4] Hypertensive encephalopathy (Primary)  [R53.1] Generalized  weakness  [I61.9] Intraparenchymal hemorrhage of brain - Possible  [F05] Delirium superimposed on dementia          ED Disposition Condition    Admit                   [1]         Cindy Lozada MD  03/19/25 0100

## 2025-03-20 PROBLEM — R53.1 WEAKNESS: Status: ACTIVE | Noted: 2025-03-20

## 2025-03-20 PROBLEM — Z51.5 COMFORT MEASURES ONLY STATUS: Status: ACTIVE | Noted: 2025-03-20

## 2025-03-20 PROCEDURE — 97162 PT EVAL MOD COMPLEX 30 MIN: CPT

## 2025-03-20 PROCEDURE — 11000001 HC ACUTE MED/SURG PRIVATE ROOM

## 2025-03-20 PROCEDURE — 25000003 PHARM REV CODE 250: Performed by: INTERNAL MEDICINE

## 2025-03-20 PROCEDURE — 25000003 PHARM REV CODE 250

## 2025-03-20 PROCEDURE — 63600175 PHARM REV CODE 636 W HCPCS

## 2025-03-20 PROCEDURE — 20000000 HC ICU ROOM

## 2025-03-20 PROCEDURE — 97166 OT EVAL MOD COMPLEX 45 MIN: CPT

## 2025-03-20 RX ORDER — DEXTROSE MONOHYDRATE 50 MG/ML
INJECTION, SOLUTION INTRAVENOUS CONTINUOUS
Status: ACTIVE | OUTPATIENT
Start: 2025-03-20 | End: 2025-03-20

## 2025-03-20 RX ORDER — GLYCOPYRROLATE 0.2 MG/ML
0.1 INJECTION INTRAMUSCULAR; INTRAVENOUS 3 TIMES DAILY PRN
Status: DISCONTINUED | OUTPATIENT
Start: 2025-03-20 | End: 2025-03-25 | Stop reason: HOSPADM

## 2025-03-20 RX ORDER — MORPHINE SULFATE 4 MG/ML
2 INJECTION, SOLUTION INTRAMUSCULAR; INTRAVENOUS EVERY 4 HOURS PRN
Status: DISCONTINUED | OUTPATIENT
Start: 2025-03-20 | End: 2025-03-25 | Stop reason: HOSPADM

## 2025-03-20 RX ORDER — LORAZEPAM 2 MG/ML
0.5 INJECTION INTRAMUSCULAR EVERY 30 MIN PRN
Status: DISCONTINUED | OUTPATIENT
Start: 2025-03-20 | End: 2025-03-25 | Stop reason: HOSPADM

## 2025-03-20 RX ORDER — LORAZEPAM 2 MG/ML
1 INJECTION INTRAMUSCULAR EVERY 4 HOURS PRN
Status: DISCONTINUED | OUTPATIENT
Start: 2025-03-20 | End: 2025-03-25 | Stop reason: HOSPADM

## 2025-03-20 RX ADMIN — LEVETIRACETAM 500 MG: 100 INJECTION, SOLUTION INTRAVENOUS at 01:03

## 2025-03-20 RX ADMIN — HYDRALAZINE HYDROCHLORIDE 10 MG: 20 INJECTION INTRAMUSCULAR; INTRAVENOUS at 08:03

## 2025-03-20 RX ADMIN — LORAZEPAM 1 MG: 2 INJECTION INTRAMUSCULAR; INTRAVENOUS at 09:03

## 2025-03-20 RX ADMIN — HYDRALAZINE HYDROCHLORIDE 10 MG: 20 INJECTION INTRAMUSCULAR; INTRAVENOUS at 12:03

## 2025-03-20 RX ADMIN — LEVETIRACETAM 500 MG: 100 INJECTION, SOLUTION INTRAVENOUS at 02:03

## 2025-03-20 RX ADMIN — MUPIROCIN: 20 OINTMENT TOPICAL at 08:03

## 2025-03-20 RX ADMIN — LORAZEPAM 1 MG: 2 INJECTION INTRAMUSCULAR; INTRAVENOUS at 05:03

## 2025-03-20 RX ADMIN — HYDRALAZINE HYDROCHLORIDE 10 MG: 20 INJECTION INTRAMUSCULAR; INTRAVENOUS at 04:03

## 2025-03-20 RX ADMIN — DEXTROSE MONOHYDRATE: 50 INJECTION, SOLUTION INTRAVENOUS at 01:03

## 2025-03-20 NOTE — PT/OT/SLP EVAL
Physical Therapy Evaluation and Discharge Note    Patient Name:  Keneny Rose   MRN:  [id number]    Recommendations:     Discharge therapy intensity: No Therapy Indicated   Discharge Equipment Recommendations: none   Barriers to discharge:  medical dx, impaired mobility, decreased independence     Assessment:     Kenney Rose is a 89 y.o. male admitted with a medical diagnosis of IPH/SAH, Hypertension encephalopathy.     Pt with significant decline in functional status as compared to baseline. Per son, prior to admission, pt Roderick/SBA for mobility with use of cane- did have 2 falls day of admission + required assistance for all ALDs, except could feed himself.   Due to pt's unfortunate decline 2/2 diagnosis, family is electing for NH placement with hospice--this has been confirmed with palliative medicine services on pt's case. Therefore, PT to sign off. Please re-consult if situation changes.    Today, pt noted to be restless in bed. Moving all extremities spontaneously. Limited to no command following. Attempted to stand, but unable to successfully complete tasks despite maxAx2.     Recent Surgery: * No surgery found *      Plan:     During this hospitalization, patient does not require further acute PT services.  Please re-consult if situation changes.      Subjective     Chief Complaint: n/a  Patient/Family Comments/goals: unknown at this time  Pain/Comfort:  Pain Rating 1: 0/10    Patients cultural, spiritual, Taoist conflicts given the current situation: no    Living Environment:  Per son, pt lives with son and spouse  Prior to admission, patients level of function was Roderick/SBA for mobility.    Equipment used at home: cane, straight.  DME owned (not currently used): none.    Upon discharge, patient will have assistance from son- he is the primary caregiver... however, due to pt's current LOF this is an increased burden on him and outside of his capabilities therefore family electing NH placement with  hospice.    Objective:     Communicated with NSG prior to session.  Patient found HOB elevated with blood pressure cuff, pulse ox (continuous), telemetry, SCD, peripheral IV (B mittens, roll belt) upon PT entry to room.    General Precautions: Standard, fall (BP <140/90)    Orthopedic Precautions:N/A   Braces: N/A  Respiratory Status: Room air  Blood Pressure: 131/72    Exams:  Cognitive Exam:  Patient is oriented to Person  BLE Strength:unable to formally assess 2/2 impaired command following; did kick B LE, delayed from command     Functional Mobility:  Bed Mobility:     Supine to Sit: maximal assistance  Sit to Supine: maximal assistance  Transfers:     Sit to/from Stand:  maximal assistance and of 2 persons with RW x1 trial and HHA x2 trials  On all of the trials pt with increased trunk/hip flexion and inability to achieve a fully upright position despite max TC/VC  Balance: CGA-modA; pt with fluctuating trunk control 2/2 decreased awareness of situation; trying to lie back down; kyphotic posture    AM-PAC 6 CLICK MOBILITY  Total Score:10       Treatment and Education:  Patient provided with verbal education regarding PT role/goals/POC, fall prevention, safety awareness, and discharge/DME recommendations.  Additional teaching is warranted.     Patient left HOB elevated with all lines intact, call button in reach, RN notified, and roll belt donned, B mittens donned .    GOALS:   Multidisciplinary Problems       Physical Therapy Goals       Not on file                    History:     No past medical history on file.    No past surgical history on file.    Time Tracking:     PT Received On: 03/20/25  PT Start Time: 0827     PT Stop Time: 0845  PT Total Time (min): 18 min     Billable Minutes: Evaluation mod      03/20/2025

## 2025-03-20 NOTE — HPI
89 year old male with a past medical history of dementia (non-verbal at baseline), HTN, HLD, PVD, and CAD s/p CABG on DAPT presented to ED on 3/18 as a transfer from [facility] for neurosurgical services.  He initially presented from home to [facility] for generalized weakness, slurred speech, and difficulty standing.  His last known normal was 3/17 at 2100.  At outlying facility, BP 230s/150s.  CT head was significant for parietal hyperdense area calcification vs hemorrhage.  Upon transfer to [facility], /63.  Neurosurgery was consulted with no plans of surgical intervention, recommended BP management. Neurology was consulted for ICH vs stroke.    Discussed case with nursing, no family currently at bedside.  Nursing reports family has not decided about definitive goals of care plan.

## 2025-03-20 NOTE — PROGRESS NOTES
Patient Name: Kenney Roes   MRN: [id number]   Admission Date: 3/18/2025   Hospital Length of Stay: 2   Attending Provider: Rossy Jacques MD   Consulting Provider: Mesha WASHINGTON  Reason for Consult: Goals of Care  Primary Care Physician: Magdi Walton MD     Principal Problem: <principal problem not specified>     Patient information was obtained from relative(s) and ER records.      Final diagnoses:  [I67.4] Hypertensive encephalopathy (Primary)  [R53.1] Generalized weakness  [I61.9] Intraparenchymal hemorrhage of brain - Possible  [F05] Delirium superimposed on dementia     Assessment/Plan:     I reviewed the patient and family's understanding of the seriousness of the illness and its expected prognosis. We discussed the patient's goals of care and treatment preferences.  I clarified current code status. I identified the surrogate decision maker or health care POA.  I answered all questions and we formulated a plan including recommendations for symptom management and how to best achieve goals of care.  Advance Care Planning     Date: 03/20/2025    [facility]  I engaged the son and wife in a voluntary conversation about advance care planning and we specifically addressed what the goals of care would be moving forward, in light of the patient's change in clinical status, specifically current condition.  We did specifically address the patient's likely prognosis, which is poor.  We explored the patient's values and preferences for future care.  The family endorses that what is most important right now is to focus on avoiding the hospital, improvement in condition but with limits to invasive therapies, and comfort and QOL . The son acknowledges that he would not be able to care for him 24 hours a day at home. He agrees that the best option moving forward would be nursing home.     Accordingly, we have decided that the best plan to meet the patient's goals includes enrolling in hospice care   This discussion occurred  on a fully voluntary basis with the verbal consent of the patient and/or family.     Hospice  I reviewed the benefits of home hospice care, including aggressive symptom-based management with the intent to avoid future hospitalizations which may increase increase the risk of having a negative effect on her quality of life. I reviewed the benefit of regular visits by an assigned RN and 24/7 on call RN to address uncontrolled symptoms which may precipitate into a symptom crisis. This may prevent unwanted ER visits or hospitalizations ordinarily necessary to manage symptoms of an advanced illness. I also reviewed benefit of regular CNA visits and the option of  and  visitations. I explained that all medications related to her diagnosis and symptom related medications would be covered by hospice, including oxygen, a hospital bed and other durable medical equipment.   We willbe making a hospice referral.              Recommendations:     Nursing home with hospice. Discussed with case management      History of Present Illness:     No acute events overnight. Neurology and neurosurgery signed off. Remains DNR status. Neurological status remains unchanged.      Active Ambulatory Problems     Diagnosis Date Noted    No Active Ambulatory Problems     Resolved Ambulatory Problems     Diagnosis Date Noted    No Resolved Ambulatory Problems     No Additional Past Medical History        No past surgical history on file.     Review of patient's allergies indicates:   Allergen Reactions    Penicillins         Current Medications[1]       Current Facility-Administered Medications:     hydrALAZINE, 10 mg, Intravenous, Q4H PRN    labetalol, 10 mg, Intravenous, Q4H PRN    sodium chloride 0.9%, 10 mL, Intravenous, PRN     No family history on file.     Review of Systems   Unable to perform ROS: Dementia            Objective:   /72   Pulse 100   Temp 98.2 °F (36.8 °C) (Oral)   Resp 15   Ht 6' (1.829 m)   Wt  92.4 kg (203 lb 11.3 oz)   SpO2 97%   BMI 27.63 kg/m²      Physical Exam  Constitutional:       Appearance: He is ill-appearing.   HENT:      Head: Normocephalic and atraumatic.   Cardiovascular:      Rate and Rhythm: Normal rate.   Pulmonary:      Effort: Pulmonary effort is normal.   Neurological:      Mental Status: He is lethargic and disoriented.      Motor: Weakness present.             Review of Symptoms      Symptom Assessment (ESAS 0-10 Scale)  Pain:  0  Dyspnea:  0  Anxiety:  0  Nausea:  0  Depression:  0  Anorexia:  0  Fatigue:  0  Insomnia:  0  Restlessness:  0  Agitation:  0         Performance Status:  20    Living Arrangements:  Lives with family    Psychosocial/Cultural:   See Palliative Psychosocial Note: Yes  **Primary  to Follow**  Palliative Care  Consult: No      Advance Care Planning   Advance Directives:     Decision Making:  Family answered questions  Goals of Care: What is most important right now is to focus on comfort and QOL . Accordingly, we have decided that the best plan to meet the patient's goals includes enrolling in hospice care.          PAINAD: NA    Caregiver burden formerly assessed: Yes        > 50% of 50 min of encounter was spent in chart review, face to face discussion of goals of care, symptom assessment, coordination of care and emotional support.         Mesha Molina, RAFAL  Palliative Medicine  [facility]           [1]   Current Facility-Administered Medications:     0.9% NaCl infusion, , Intravenous, Continuous, Nathaniel Ernandez Jr., MD, [facility], Last Rate: 10 mL/hr at 03/20/25 0920, Rate Verify at 03/20/25 0920    hydrALAZINE injection 10 mg, 10 mg, Intravenous, Q4H PRN, Ruben Joshi DO, 10 mg at 03/20/25 0807    labetaloL injection 10 mg, 10 mg, Intravenous, Q4H PRN, Ruben Joshi DO, 10 mg at 03/19/25 1409    levETIRAcetam (Keppra) 500 mg in D5W 100 mL IVPB (MB+), 500 mg, Intravenous, Q12H, Janneth Howard MD, Stopped  at 03/20/25 0254    mupirocin 2 % ointment, , Nasal, BID, Nathaniel Ernandez Jr., MD, [facility], Given at 03/20/25 0807    niCARdipine 40 mg/200 mL (0.2 mg/mL) infusion, 0-15 mg/hr, Intravenous, Continuous, Cindy Lozada MD, Stopped at 03/19/25 1132    sodium chloride 0.9% flush 10 mL, 10 mL, Intravenous, PRN, Ruben Joshi DO

## 2025-03-20 NOTE — PROGRESS NOTES
[facility]  Hospital Medicine Progress Note        Chief Complaint: Inpatient Follow-up     HPI: Kenney Rose is a 88 yo male w PMHx of non-verbal dementia, HTN, HLD, PVD, and CAD s/p CABG x 1 on DAPT who was transferred from [facility] for possible cortical hemorrhage and neurosurgical services. Wife at bedside, states that patient would not want chest compressions in the event of cardiac arrest. Patient initially presented from his home to [facility] in [place] and subsequently transferred to [place]. Reportedly increased generalized weakness and difficulty standing from seated position from his baseline as well as slurred speech since this morning when he woke up. Last known normal was 2100 yesterday. Hypertensive upon initial presentation BP 230s/150s, was given Hydralazine 20mg x 1. CT head wo contrast consistent with generalized atrophy, chronic appearing small vessel ischemic changes, and 6.1 mm R parietal hyperdense area labelled as calcification vs cortical hemorrhage. CT chest negative for pneumothorax.      Following transfer to [facility], BP noted to be in 140s, however all VSS otherwise and afebrile. GCS 13. Neurosurgery was paged, recommended SBP goal <160 as unsure if this is a true hemorrhage. Patient was loaded on Keppra and given Labetalol 10mg x 1 in ED. Admitted to ICU for close monitoring. Seen by Palliative Medicine.     Being downgraded to Hospital medicine services during the evening hours of 3/19.  Blood pressure in 130s systolic/70s diastolic.  Afebrile.  Stable on room air.  Continued on Keppra.    Therapy evaluations ordered .    Interval Hx:   Therapy evaluations in progress. Seen by Neurology.  No further recommendations.  Neurosurgery signed off.  Palliative Medicine following up with patient's family regarding goals of care.    Patient was seen and examined.  Case discussed with ICU nurse.    Objective/physical exam:  General: In no acute  distress, afebrile  Chest: Clear to auscultation bilaterally  Heart:  +S1, S2, normal rate  Abdomen: Soft, nontender, BS +  MSK: Warm, no lower extremity edema, no clubbing or cyanosis  Neurologic: Alert , does not follow commands, able to move all extremities spontaneously.    VITAL SIGNS: 24 HRS MIN & MAX LAST   Temp  Min: 98.2 °F (36.8 °C)  Max: 99.8 °F (37.7 °C) 98.2 °F (36.8 °C)   BP  Min: 96/76  Max: 191/72 (!) 131/47   Pulse  Min: 53  Max: 100  63   Resp  Min: 13  Max: 22 15   SpO2  Min: 94 %  Max: 100 % 98 %     I have reviewed the following labs:  Recent Labs   Lab 03/19/25  0041   WBC 6.77   RBC 4.49*   HGB 13.3*   HCT 41.6*   MCV 92.7   MCH 29.6   MCHC 32.0*   RDW 15.8   *   MPV 12.7*     Recent Labs   Lab 03/19/25  0040      K 3.5      CO2 19*   BUN 13.0   CREATININE 0.86   CALCIUM 8.5*   MG 2.00   ALBUMIN 3.5   ALKPHOS 78   ALT 11   AST 36*   BILITOT 1.1     Microbiology Results (last 7 days)       ** No results found for the last 168 hours. **             See below for Radiology    Assessment/Plan:  Intraparenchymal /subarachnoid hemorrhage  Weakness/slurred speech  Hypertensive encephalopathy  Delirium superimposed on dementia  Mild thrombocytopenia    Plan    Hold any antiplatelets/anticoagulation  Continue neuro checks  Monitor blood pressure, goal less than 140 /90  Continue Kera  Neurosurgery following, recommended stroke neurology consult, consulted.  No further recommendations.  Recommended palliative care.  Palliative medicine following, coordinating with family about discharge options.  Await PTOT speech evaluations.  Resume home oral medications upon clearance by speech.  Fluids while NPO      VTE prophylaxis:  SCDs      Anticipated discharge and Disposition:   To be decided.  Discussions about hospice going on with the family      All diagnosis and differential diagnosis have been reviewed; assessment and plan has been documented; I have personally reviewed the labs and  test results that are presently available; I have reviewed the patients medication list; I have reviewed the consulting providers response and recommendations. I have reviewed or attempted to review medical records based upon their availability    All of the patient's questions have been  addressed and answered. Patient's is agreeable to the above stated plan. I will continue to monitor closely and make adjustments to medical management as needed.    Portions of this note dictated using EMR integrated voice recognition software, and may be subject to voice recognition errors not corrected at proofreading. Please contact writer for clarification if needed.   _____________________________________________________________________    Malnutrition Status:  Nutrition consulted. Most recent weight and BMI monitored-     Measurements:  Wt Readings from Last 1 Encounters:   03/19/25 92.4 kg (203 lb 11.3 oz)   Body mass index is 27.63 kg/m².    Patient has been screened and assessed by RD.    Malnutrition Type:  Context:    Level:      Malnutrition Characteristic Summary:       Interventions/Recommendations (treatment strategy):        Scheduled Med:   levETIRAcetam (Keppra) IV (PEDS and ADULTS)  500 mg Intravenous Q12H    mupirocin   Nasal BID      Continuous Infusions:   0.9% NaCl   Intravenous Continuous 10 mL/hr at 03/20/25 0920 Rate Verify at 03/20/25 0920    nicardipine  0-15 mg/hr Intravenous Continuous   Stopped at 03/19/25 1132      PRN Meds:    Current Facility-Administered Medications:     hydrALAZINE, 10 mg, Intravenous, Q4H PRN    labetalol, 10 mg, Intravenous, Q4H PRN    sodium chloride 0.9%, 10 mL, Intravenous, PRN     Radiology:  I have personally reviewed the following imaging and agree with the radiologist.     CT Head Without Contrast  Narrative: EXAMINATION:  CT HEAD WITHOUT CONTRAST    CLINICAL HISTORY:  cortical hemorrhage versus calcification;    TECHNIQUE:  Axial scans were obtained from skull base to  the vertex.    Coronal and sagittal reconstructions obtained from the axial data.    Automatic exposure control was utilized to limit radiation dose.    Contrast: None    Radiation Dose:    Total DLP: 18 80 mGy*cm    COMPARISON:  Outside CT head dated 03/18/2025    FINDINGS:  There is stable trace scattered subarachnoid hemorrhage over the right cerebral hemisphere.  Patchy hypodensities in the subcortical and periventricular white matter likely represent chronic ischemic changes.    There is no significant mass effect or midline shift.  The basal cisterns are patent.  There is diffuse parenchymal volume loss.  The calvarium appears intact.  Impression: Stable trace scattered subarachnoid hemorrhage over the right cerebral hemisphere.    Electronically signed by: Xenia Linton  Date:    03/19/2025  Time:    10:43      Rossy Jacques MD  Department of Hospital Medicine   [facility]   03/20/2025

## 2025-03-20 NOTE — PROGRESS NOTES
03/20/25 1546   Missed Time Reason   SLP Attempted Eval Date 03/20/25   Missed Treatment Reason Patient fatigue

## 2025-03-20 NOTE — CONSULTS
[facility]  Neurology  Consult Note    Patient Name: Kenney Rose  MRN: [id number]  Admission Date: 3/18/2025  Hospital Length of Stay: 2 days  Code Status: DNR   Attending Provider: Rossy Jacques MD   Consulting Provider: Jody Hassan NP  Primary Care Physician: Magdi Walton MD  Principal Problem:<principal problem not specified>    Inpatient consult to Neurology Services (Vascular Neurology)  Consult performed by: Jody Hassan NP  Consult ordered by: Rossy Jacques MD         Subjective:     Chief Complaint:  generalized weakness, slurred speech      HPI:   89 year old male with a past medical history of dementia (non-verbal at baseline), HTN, HLD, PVD, and CAD s/p CABG on DAPT presented to ED on 3/18 as a transfer from [facility] for neurosurgical services.  He initially presented from home to [facility] for generalized weakness, slurred speech, and difficulty standing.  His last known normal was 3/17 at 2100.  At outlying facility, BP 230s/150s.  CT head was significant for parietal hyperdense area calcification vs hemorrhage.  Upon transfer to [facility], /63.  Neurosurgery was consulted with no plans of surgical intervention, recommended BP management. Neurology was consulted for ICH vs stroke.    Discussed case with nursing, no family currently at bedside.  Nursing reports family has not decided about definitive goals of care plan.        No past medical history on file.    No past surgical history on file.    Review of patient's allergies indicates:   Allergen Reactions    Penicillins        Current Neurological Medications:     No current facility-administered medications on file prior to encounter.     Current Outpatient Medications on File Prior to Encounter   Medication Sig    amLODIPine (NORVASC) 5 MG tablet Take 5 mg by mouth once daily.    citalopram (CELEXA) 10 MG tablet Take 10 mg by mouth every morning.    donepeziL (ARICEPT) 10 MG tablet  Take 10 mg by mouth every evening.    FEROSUL 325 mg (65 mg iron) Tab tablet Take 325 mg by mouth once daily.    furosemide (LASIX) 40 MG tablet Take 40 mg by mouth once daily.    HYDROcodone-acetaminophen (NORCO)  mg per tablet Take 1 tablet by mouth 3 (three) times daily as needed.    levothyroxine (SYNTHROID) 50 MCG tablet Take 50 mcg by mouth before breakfast.    montelukast (SINGULAIR) 10 mg tablet Take 10 mg by mouth once daily.    potassium chloride SA (K-DUR,KLOR-CON) 20 MEQ tablet Take 20 mEq by mouth once daily.    QUEtiapine (SEROQUEL) 50 MG tablet Take 50 mg by mouth 2 (two) times daily.    terazosin (HYTRIN) 10 MG capsule Take 10 mg by mouth once daily.    atorvastatin (LIPITOR) 80 MG tablet Take 80 mg by mouth every evening.    clopidogreL (PLAVIX) 75 mg tablet Take 75 mg by mouth once daily.    loratadine (CLARITIN) 10 mg tablet Take 1 tablet by mouth every morning.    metoprolol tartrate (LOPRESSOR) 50 MG tablet Take 50 mg by mouth 2 (two) times daily.    rivaroxaban (XARELTO) 20 mg Tab Take 20 mg by mouth daily with dinner or evening meal.     Family History    None       Tobacco Use    Smoking status: Not on file    Smokeless tobacco: Not on file   Substance and Sexual Activity    Alcohol use: Not on file    Drug use: Not on file    Sexual activity: Not on file     Review of Systems   Unable to perform ROS: Acuity of condition     Objective:     Vital Signs (Most Recent):  Temp: 98.2 °F (36.8 °C) (03/20/25 0800)  Pulse: 100 (03/20/25 0830)  Resp: 15 (03/20/25 0830)  BP: 131/72 (03/20/25 0830)  SpO2: 97 % (03/20/25 0830) Vital Signs (24h Range):  Temp:  [98.2 °F (36.8 °C)-99.8 °F (37.7 °C)] 98.2 °F (36.8 °C)  Pulse:  [] 100  Resp:  [12-22] 15  SpO2:  [94 %-100 %] 97 %  BP: ()/(63-95) 131/72     Weight: 92.4 kg (203 lb 11.3 oz)  Body mass index is 27.63 kg/m².     Physical Exam  Vitals and nursing note reviewed.   Constitutional:       General: He is not in acute distress.      Appearance: He is ill-appearing.   Eyes:      Pupils: Pupils are equal, round, and reactive to light.   Musculoskeletal:      Right lower leg: No edema.      Left lower leg: No edema.   Skin:     General: Skin is warm and dry.      Capillary Refill: Capillary refill takes less than 2 seconds.   Neurological:      Mental Status: He is lethargic.      Comments:   Limited PE  -does not participate in exam, does not follow commands  -PERR, resists having eyes opened, no gaze preference  -spontaneously moving BUE, BLE, no focal weakness appreciated, although he does seem to be generally weak  -nonverbal during exam  -no obvious facial asymmetry  -covers up his head with the blanket at the end of exam          NEUROLOGICAL EXAMINATION:     CRANIAL NERVES     CN III, IV, VI   Pupils are equal, round, and reactive to light.      Significant Labs: All pertinent lab results from the past 24 hours have been reviewed.    Significant Imaging: I have reviewed all pertinent imaging results/findings within the past 24 hours.  Assessment and Plan:     Weakness  -presented with generalized weakness, slurred speech, unable to stand  -PMH: HTN, HLD, dementia (non-verbal at baseline)    CT head: generalized atrophy, chronic appearing small vessel ischemic changes, and 6.1 mm R parietal hyperdense area labelled as calcification vs cortical hemorrhage.       Plan  - recommend SBP less than 140  - no interventions from a neurology standpoint  - recommend palliative care consult to discuss goals of care with family  - avoid antiplatelets/anticoagulation for now   - seizure precautions  - no further workup from a neurology/stroke standpoint    Care update  - chart reviewed  - planning for enrollment in hospice care  - will sign off at this time, please call with questions  - supportive care for family       VTE Risk Mitigation (From admission, onward)           Ordered     Reason for No Pharmacological VTE Prophylaxis  Once        Question:   Reasons:  Answer:  Active Bleeding    03/19/25 0026     IP VTE HIGH RISK PATIENT  Once         03/19/25 0026     Place sequential compression device  Until discontinued         03/19/25 0026                  Further recommendations to follow from MD Jody Hassan, NP  Neurology  [facility]

## 2025-03-20 NOTE — SUBJECTIVE & OBJECTIVE
No past medical history on file.    No past surgical history on file.    Review of patient's allergies indicates:   Allergen Reactions    Penicillins        Current Neurological Medications:     No current facility-administered medications on file prior to encounter.     Current Outpatient Medications on File Prior to Encounter   Medication Sig    amLODIPine (NORVASC) 5 MG tablet Take 5 mg by mouth once daily.    citalopram (CELEXA) 10 MG tablet Take 10 mg by mouth every morning.    donepeziL (ARICEPT) 10 MG tablet Take 10 mg by mouth every evening.    FEROSUL 325 mg (65 mg iron) Tab tablet Take 325 mg by mouth once daily.    furosemide (LASIX) 40 MG tablet Take 40 mg by mouth once daily.    HYDROcodone-acetaminophen (NORCO)  mg per tablet Take 1 tablet by mouth 3 (three) times daily as needed.    levothyroxine (SYNTHROID) 50 MCG tablet Take 50 mcg by mouth before breakfast.    montelukast (SINGULAIR) 10 mg tablet Take 10 mg by mouth once daily.    potassium chloride SA (K-DUR,KLOR-CON) 20 MEQ tablet Take 20 mEq by mouth once daily.    QUEtiapine (SEROQUEL) 50 MG tablet Take 50 mg by mouth 2 (two) times daily.    terazosin (HYTRIN) 10 MG capsule Take 10 mg by mouth once daily.    atorvastatin (LIPITOR) 80 MG tablet Take 80 mg by mouth every evening.    clopidogreL (PLAVIX) 75 mg tablet Take 75 mg by mouth once daily.    loratadine (CLARITIN) 10 mg tablet Take 1 tablet by mouth every morning.    metoprolol tartrate (LOPRESSOR) 50 MG tablet Take 50 mg by mouth 2 (two) times daily.    rivaroxaban (XARELTO) 20 mg Tab Take 20 mg by mouth daily with dinner or evening meal.     Family History    None       Tobacco Use    Smoking status: Not on file    Smokeless tobacco: Not on file   Substance and Sexual Activity    Alcohol use: Not on file    Drug use: Not on file    Sexual activity: Not on file     Review of Systems   Unable to perform ROS: Acuity of condition     Objective:     Vital Signs (Most Recent):  Temp:  98.2 °F (36.8 °C) (03/20/25 0800)  Pulse: 100 (03/20/25 0830)  Resp: 15 (03/20/25 0830)  BP: 131/72 (03/20/25 0830)  SpO2: 97 % (03/20/25 0830) Vital Signs (24h Range):  Temp:  [98.2 °F (36.8 °C)-99.8 °F (37.7 °C)] 98.2 °F (36.8 °C)  Pulse:  [] 100  Resp:  [12-22] 15  SpO2:  [94 %-100 %] 97 %  BP: ()/(63-95) 131/72     Weight: 92.4 kg (203 lb 11.3 oz)  Body mass index is 27.63 kg/m².     Physical Exam  Vitals and nursing note reviewed.   Constitutional:       General: He is not in acute distress.     Appearance: He is ill-appearing.   Eyes:      Pupils: Pupils are equal, round, and reactive to light.   Musculoskeletal:      Right lower leg: No edema.      Left lower leg: No edema.   Skin:     General: Skin is warm and dry.      Capillary Refill: Capillary refill takes less than 2 seconds.   Neurological:      Mental Status: He is lethargic.      Comments:   Limited PE  -does not participate in exam, does not follow commands  -PERR, resists having eyes opened, no gaze preference  -spontaneously moving BUE, BLE, no focal weakness appreciated, although he does seem to be generally weak  -nonverbal during exam  -no obvious facial asymmetry  -covers up his head with the blanket at the end of exam          NEUROLOGICAL EXAMINATION:     CRANIAL NERVES     CN III, IV, VI   Pupils are equal, round, and reactive to light.      Significant Labs: All pertinent lab results from the past 24 hours have been reviewed.    Significant Imaging: I have reviewed all pertinent imaging results/findings within the past 24 hours.

## 2025-03-20 NOTE — PROGRESS NOTES
[facility] [address]  Neurosurgery  Progress Note    Subjective:     Interval History:  Follow up increased weakness and speech issues, CT with right parietal cortical hyperdense area calcification versus cortical hemorrhage.      Family spoke with palliative we will be enrolling patient in hospice   Patient is DNR       Post-Op Info:  * No surgery found *          Medications:  Continuous Infusions:   0.9% NaCl   Intravenous Continuous 10 mL/hr at 03/20/25 0920 Rate Verify at 03/20/25 0920    nicardipine  0-15 mg/hr Intravenous Continuous   Stopped at 03/19/25 1132     Scheduled Meds:   levETIRAcetam (Keppra) IV (PEDS and ADULTS)  500 mg Intravenous Q12H    mupirocin   Nasal BID     PRN Meds:  Current Facility-Administered Medications:     hydrALAZINE, 10 mg, Intravenous, Q4H PRN    labetalol, 10 mg, Intravenous, Q4H PRN    sodium chloride 0.9%, 10 mL, Intravenous, PRN     Review of Systems  Objective:     Weight: 92.4 kg (203 lb 11.3 oz)  Body mass index is 27.63 kg/m².  Vital Signs (Most Recent):  Temp: 98.2 °F (36.8 °C) (03/20/25 0800)  Pulse: 100 (03/20/25 0830)  Resp: 15 (03/20/25 0830)  BP: 131/72 (03/20/25 0830)  SpO2: 97 % (03/20/25 0830) Vital Signs (24h Range):  Temp:  [98.2 °F (36.8 °C)-99.8 °F (37.7 °C)] 98.2 °F (36.8 °C)  Pulse:  [] 100  Resp:  [12-22] 15  SpO2:  [94 %-100 %] 97 %  BP: ()/(63-95) 131/72     Date 03/20/25 0700 - 03/21/25 0659   Shift 2163-4990 5419-8163 7099-0352 24 Hour Total   INTAKE   I.V.(mL/kg) 143.4(1.6)   143.4(1.6)   IV Piggyback 99.8   99.8   Shift Total(mL/kg) 243.2(2.6)   243.2(2.6)   OUTPUT   Shift Total(mL/kg)       Weight (kg) 92.4 92.4 92.4 92.4                       Male External Urinary Catheter 03/19/25 0300 (Active)   Skin no redness;no breakdown 03/20/25 0800   Tolerance no signs/symptoms of discomfort 03/20/25 0800   Output (mL) 550 mL 03/20/25 0600   Catheter Change Date 03/19/25 03/19/25 2121       Neurosurgery Physical  Exam    Significant Labs:  Recent Labs   Lab 03/19/25  0040      K 3.5      CO2 19*   BUN 13.0   CREATININE 0.86   CALCIUM 8.5*   MG 2.00     Recent Labs   Lab 03/19/25  0041   WBC 6.77   HGB 13.3*   HCT 41.6*   *     Recent Labs   Lab 03/19/25  0040   INR 1.1   APTT 26.3     Microbiology Results (last 7 days)       ** No results found for the last 168 hours. **            Assessment/Plan:    Palliative care  Hospice  DNR status    We will sign off from a neurosurgical standpoint.     There are no hospital problems to display for this patient.      Akanksha Goyal, [facility]  Neurosurgery  [facility] - [facility]

## 2025-03-20 NOTE — ASSESSMENT & PLAN NOTE
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       Plan  - recommend SBP less than 140  - no interventions from a neurology standpoint  - recommend palliative care consult to discuss goals of care with family  - avoid antiplatelets/anticoagulation for now   - seizure precautions

## 2025-03-20 NOTE — PT/OT/SLP EVAL
Occupational Therapy   Evaluation and Discharge Note    Name: Kenney Rose  MRN: [id number]  Admitting Diagnosis: SAH  Recent Surgery: * No surgery found *      Recommendations:     Discharge therapy intensity: No Therapy Indicated   Discharge Equipment Recommendations: none  Barriers to discharge:  none evident    Assessment:   Kenney Rose is a 89 y.o. male with a medical diagnosis of intraparenchymal/subarachnoid hemorrhage, htn encephalopathy, delirium superimposed on dementia.  Pt asleep upon OT arrival, drowsy.  More awake while seated EOB.  He presents able to state name.  Maxx2 to stand.    OT reached out to son to obtain PLOF, see below.  Family concerned about their ability to take him home with hospice or if needing NH placement with possible hospice services.  Family meeting pending at time of OT evaluation.  Later, it was decided that family will seek NH placement with hospice services per palliative NP.  Skilled OT services are not warranted at this time.  Please re-consult as needed.  Thank you-    Plan:     OT to sign off as acute OT services are not warranted at this time.  Please re-consult if situation changes during this hospitalization.    Plan of Care Reviewed with: patient (OT called son to confirm history and PLOF (primary caregiver))    Subjective     Chief Complaint: no complaints, mostly nonverbal  Patient/Family Comments/goals: TBD, family concerned about ability to take care of him at home, seeking NH with hospice    Occupational Profile:  Living Environment: OT reached out to son (primary caregiver), lives with wife and son, pt utilizes cane and ambulates without assist.  He requires assist for med management, toileting, bathing, dressing tasks.  Occasional participation from pt.  He is able to feed self at baseline.  Son with him most of the time.  Previous level of function: assist ADLs, utilizes cane for mobility but able to walk without assist  Roles and Routines: father,  spouse  Equipment Used at Home: cane, straight  Assistance upon Discharge: family concerned about ability to take care of him at home at this level-seeking NH placement with hospice.    Pain/Comfort:  Pain Rating 1: 0/10    Patients cultural, spiritual, Adventism conflicts given the current situation:      Objective:     OT communicated with RN prior to session.      Patient was found  supine   with  (roll belt, mittens, purewick, vital monitoring) upon OT entry to room.  Pt actively repositioning self in bed.    General Precautions: Standard, <140  Orthopedic Precautions: N/A  Braces: N/A    Vital Signs: 131/72    Bed Mobility:    Patient completed Supine to Sit with moderate assistance  Patient completed Sit to Supine with maximal assistance    Functional Mobility/Transfers:  Patient completed Sit <> Stand Transfer with maxx2  with  rolling walker   Functional Mobility: maxx2 to stand, difficulty with full extension of knees and trunk    Activities of Daily Living:  Feeding:  does not occur limited by cognition but also pt NPO  Lower Body Dressing: maximal assistance    Toileting: maximal assistance      AMPAC 6 Click ADL:  AMPAC Total Score: 11    Functional Cognition:  Able to state name, not able to state any further information  Chart reports nonverbal dementia at baseline    Visual Perceptual Skills:  Cues to open eyes, intermittently attended to auditory stimuli    Upper Extremity Function:  Right Upper Extremity:   Did not follow commands for MMT assessment    Left Upper Extremity:  Did not follow commands for MMT assessment    Balance:   CGA static sitting balance    Therapeutic Positioning  Risk for acquired pressure injuries is significantly increased due to relative decrease in mobility d/t hospitalization , impaired mobility, inability to communicate toileting needs, and severity of deficits resulting in prolonged immobility .    OT interventions performed during the course of today's session in an  effort to prevent and/or reduce acquired pressure injuries:   Heel floats ordered    Skin assessment: all bony prominences were assessed    Findings: no redness or breakdown noted    OT recommendations for therapeutic positioning throughout hospitalization:   Follow [facility] Pressure Injury Prevention Protocol      Patient Education:  Patient provided with verbal education education regarding OT role/goals/POC, fall prevention, safety awareness, Discharge/DME recommendations, and pressure ulcer prevention.   No learning demonstrated.      Patient left HOB elevated with all lines intact and roll belt and mitts applied, RN present .    History:     No past medical history on file.    No past surgical history on file.    Time Tracking:     OT Date of Treatment:    OT Start Time: 0823  OT Stop Time: 0845  OT Total Time (min): 22 min    Billable Minutes:Evaluation LOW    3/20/2025

## 2025-03-21 LAB
ANA SER QL HEP2 SUBST: NORMAL
C-ANCA TITR SER IF: NEGATIVE {TITER}
P-ANCA SER QL IF: NEGATIVE

## 2025-03-21 PROCEDURE — 94760 N-INVAS EAR/PLS OXIMETRY 1: CPT

## 2025-03-21 PROCEDURE — 63600175 PHARM REV CODE 636 W HCPCS

## 2025-03-21 PROCEDURE — 11000001 HC ACUTE MED/SURG PRIVATE ROOM

## 2025-03-21 PROCEDURE — 21400001 HC TELEMETRY ROOM

## 2025-03-21 PROCEDURE — 25000003 PHARM REV CODE 250

## 2025-03-21 PROCEDURE — 25000003 PHARM REV CODE 250: Performed by: INTERNAL MEDICINE

## 2025-03-21 RX ORDER — AMLODIPINE BESYLATE 5 MG/1
5 TABLET ORAL DAILY
Status: DISCONTINUED | OUTPATIENT
Start: 2025-03-21 | End: 2025-03-21

## 2025-03-21 RX ORDER — CLONIDINE 0.3 MG/24H
1 PATCH, EXTENDED RELEASE TRANSDERMAL
Status: DISCONTINUED | OUTPATIENT
Start: 2025-03-22 | End: 2025-03-25 | Stop reason: HOSPADM

## 2025-03-21 RX ORDER — LEVOTHYROXINE SODIUM 50 UG/1
50 TABLET ORAL
Status: DISCONTINUED | OUTPATIENT
Start: 2025-03-22 | End: 2025-03-25 | Stop reason: HOSPADM

## 2025-03-21 RX ORDER — METOPROLOL TARTRATE 50 MG/1
50 TABLET ORAL 2 TIMES DAILY
Status: DISCONTINUED | OUTPATIENT
Start: 2025-03-21 | End: 2025-03-21

## 2025-03-21 RX ORDER — CLONIDINE 0.2 MG/24H
1 PATCH, EXTENDED RELEASE TRANSDERMAL
Status: DISCONTINUED | OUTPATIENT
Start: 2025-03-21 | End: 2025-03-21

## 2025-03-21 RX ADMIN — HYDRALAZINE HYDROCHLORIDE 10 MG: 20 INJECTION INTRAMUSCULAR; INTRAVENOUS at 04:03

## 2025-03-21 RX ADMIN — HYDRALAZINE HYDROCHLORIDE 10 MG: 20 INJECTION INTRAMUSCULAR; INTRAVENOUS at 12:03

## 2025-03-21 RX ADMIN — LORAZEPAM 1 MG: 2 INJECTION INTRAMUSCULAR; INTRAVENOUS at 12:03

## 2025-03-21 RX ADMIN — MUPIROCIN: 20 OINTMENT TOPICAL at 08:03

## 2025-03-21 RX ADMIN — HYDRALAZINE HYDROCHLORIDE 10 MG: 20 INJECTION INTRAMUSCULAR; INTRAVENOUS at 05:03

## 2025-03-21 RX ADMIN — HYDRALAZINE HYDROCHLORIDE 10 MG: 20 INJECTION INTRAMUSCULAR; INTRAVENOUS at 08:03

## 2025-03-21 RX ADMIN — LORAZEPAM 1 MG: 2 INJECTION INTRAMUSCULAR; INTRAVENOUS at 08:03

## 2025-03-21 RX ADMIN — LABETALOL HYDROCHLORIDE 10 MG: 5 INJECTION, SOLUTION INTRAVENOUS at 01:03

## 2025-03-21 RX ADMIN — LEVETIRACETAM 500 MG: 100 INJECTION, SOLUTION INTRAVENOUS at 01:03

## 2025-03-21 RX ADMIN — LEVETIRACETAM 500 MG: 100 INJECTION, SOLUTION INTRAVENOUS at 02:03

## 2025-03-21 NOTE — PLAN OF CARE
Pt choices  obtained electronically. 1st choice is [facility] in [facility]; 2nd is  [facility] in [facility]; 3rd is [facility] in [facility].  PASRR completed in [facility] awaiting review. I will message Holly LIND of pt choices of NH placement.

## 2025-03-21 NOTE — PLAN OF CARE
Problem: Adult Inpatient Plan of Care  Goal: Plan of Care Review  Outcome: Progressing  Goal: Patient-Specific Goal (Individualized)  Outcome: Progressing  Goal: Absence of Hospital-Acquired Illness or Injury  Outcome: Progressing  Goal: Optimal Comfort and Wellbeing  Outcome: Progressing  Goal: Readiness for Transition of Care  Outcome: Progressing     Problem: Skin Injury Risk Increased  Goal: Skin Health and Integrity  Outcome: Progressing     Problem: Fall Injury Risk  Goal: Absence of Fall and Fall-Related Injury  Outcome: Progressing     Problem: Delirium  Goal: Optimal Coping  Outcome: Progressing  Goal: Improved Behavioral Control  Outcome: Progressing  Goal: Improved Attention and Thought Clarity  Outcome: Progressing  Goal: Improved Sleep  Outcome: Progressing     Problem: Coping Ineffective  Goal: Effective Coping  Outcome: Progressing     Problem: Palliative Care  Goal: Enhanced Quality of Life  Outcome: Progressing

## 2025-03-21 NOTE — PLAN OF CARE
Spoke with pt's son Daniel, he states his plan is to have pt go to a NH near [place] with hospice in NH. Pt choices of NH sent to Daniel by text. Instructed him to send back with at least 3 choices of NH.   Pt was previously living with his wife Elle and son Daniel in home and they were the caregivers.

## 2025-03-21 NOTE — NURSING
Report given to NIRANJAN Gu. Patient transported via bed to Room 889. Pt son, Daniel, notified of transfer.

## 2025-03-21 NOTE — PROGRESS NOTES
[facility]  Hospital Medicine Progress Note        Chief Complaint: Inpatient Follow-up     HPI: Kenney Rose is a 88 yo male w PMHx of non-verbal dementia, HTN, HLD, PVD, and CAD s/p CABG x 1 on DAPT who was transferred from [facility] for possible cortical hemorrhage and neurosurgical services. Wife at bedside, states that patient would not want chest compressions in the event of cardiac arrest. Patient initially presented from his home to [facility] in [place] and subsequently transferred to [place]. Reportedly increased generalized weakness and difficulty standing from seated position from his baseline as well as slurred speech since this morning when he woke up. Last known normal was 2100 yesterday. Hypertensive upon initial presentation BP 230s/150s, was given Hydralazine 20mg x 1. CT head wo contrast consistent with generalized atrophy, chronic appearing small vessel ischemic changes, and 6.1 mm R parietal hyperdense area labelled as calcification vs cortical hemorrhage. CT chest negative for pneumothorax.      Following transfer to [facility], BP noted to be in 140s, however all VSS otherwise and afebrile. GCS 13. Neurosurgery was paged, recommended SBP goal <160 as unsure if this is a true hemorrhage. Patient was loaded on Keppra and given Labetalol 10mg x 1 in ED. Admitted to ICU for close monitoring. Seen by Palliative Medicine.     Being downgraded to Hospital medicine services during the evening hours of 3/19.  Blood pressure in 130s systolic/70s diastolic.  Afebrile.  Stable on room air.  Continued on Keppra.    Therapy evaluations ordered .No therapy indicated for physical therapy.  Seen by Neurology.  No further recommendations.  Neurosurgery also signed off.  Palliative Medicine was consulted further goals discussion.    Interval Hx:   Speech therapy working with patient. Palliative medicine coordinating with family, likely wants nursing home placement on  hospice.  Case management on board.      Patient was seen and examined.  Remains confused.  Case discussed with ICU nurse.  No overnight events reported.    Objective/physical exam:  General: In no acute distress, afebrile  Chest: Clear to auscultation bilaterally  Heart:  +S1, S2, normal rate  Abdomen: Soft, nontender, BS +  MSK: Warm, no lower extremity edema, no clubbing or cyanosis  Neurologic: Alert , does not follow commands, able to move all extremities spontaneously.    VITAL SIGNS: 24 HRS MIN & MAX LAST   Temp  Min: 97.9 °F (36.6 °C)  Max: 99 °F (37.2 °C) 98.1 °F (36.7 °C)   BP  Min: 129/65  Max: 181/66 (!) 181/66   Pulse  Min: 59  Max: 109  73   Resp  Min: 14  Max: 21 20   SpO2  Min: 96 %  Max: 99 % 97 %     I have reviewed the following labs:  Recent Labs   Lab 03/19/25  0041   WBC 6.77   RBC 4.49*   HGB 13.3*   HCT 41.6*   MCV 92.7   MCH 29.6   MCHC 32.0*   RDW 15.8   *   MPV 12.7*     Recent Labs   Lab 03/19/25  0040      K 3.5      CO2 19*   BUN 13.0   CREATININE 0.86   CALCIUM 8.5*   MG 2.00   ALBUMIN 3.5   ALKPHOS 78   ALT 11   AST 36*   BILITOT 1.1     Microbiology Results (last 7 days)       ** No results found for the last 168 hours. **             See below for Radiology    Assessment/Plan:  Intraparenchymal /subarachnoid hemorrhage  Weakness/slurred speech  Hypertensive encephalopathy  Delirium superimposed on dementia  Mild thrombocytopenia    Plan    Hold any Antiplatelets/anticoagulation  Monitor blood pressure, goal less than 140 /90.  If Speech clears for p.o., order p.o. meds.  Otherwise continue iv p.r.n. ordered clonidine patch  Continue Keppra.  Neurosurgery and Neurology signed off.  Speech therapy on board  Palliative medicine following, coordinating with family about discharge options.  Likely looking at nursing home with hospice.Case management was consulted.  Awaits speech eval if not considering hospice, and resume home medications accordingly.      VTE  prophylaxis:  SCDs      Anticipated discharge and Disposition:   To be decided.  Discussions about hospice going on with the family      All diagnosis and differential diagnosis have been reviewed; assessment and plan has been documented; I have personally reviewed the labs and test results that are presently available; I have reviewed the patients medication list; I have reviewed the consulting providers response and recommendations. I have reviewed or attempted to review medical records based upon their availability    All of the patient's questions have been  addressed and answered. Patient's is agreeable to the above stated plan. I will continue to monitor closely and make adjustments to medical management as needed.    Portions of this note dictated using EMR integrated voice recognition software, and may be subject to voice recognition errors not corrected at proofreading. Please contact writer for clarification if needed.   _____________________________________________________________________    Malnutrition Status:  Nutrition consulted. Most recent weight and BMI monitored-     Measurements:  Wt Readings from Last 1 Encounters:   03/19/25 92.4 kg (203 lb 11.3 oz)   Body mass index is 27.63 kg/m².    Patient has been screened and assessed by RD.    Malnutrition Type:  Context:    Level:      Malnutrition Characteristic Summary:       Interventions/Recommendations (treatment strategy):        Scheduled Med:   levETIRAcetam (Keppra) IV (PEDS and ADULTS)  500 mg Intravenous Q12H    mupirocin   Nasal BID      Continuous Infusions:   0.9% NaCl   Intravenous Continuous 10 mL/hr at 03/20/25 0920 Rate Verify at 03/20/25 0920      PRN Meds:    Current Facility-Administered Medications:     glycopyrrolate, 0.1 mg, Intravenous, TID PRN    hydrALAZINE, 10 mg, Intravenous, Q4H PRN    labetalol, 10 mg, Intravenous, Q4H PRN    lorazepam, 0.5 mg, Intravenous, Q30 Min PRN    lorazepam, 1 mg, Intravenous, Q4H PRN    morphine,  2 mg, Intravenous, Q4H PRN    sodium chloride 0.9%, 10 mL, Intravenous, PRN     Radiology:  I have personally reviewed the following imaging and agree with the radiologist.     CT Head Without Contrast  Narrative: EXAMINATION:  CT HEAD WITHOUT CONTRAST    CLINICAL HISTORY:  cortical hemorrhage versus calcification;    TECHNIQUE:  Axial scans were obtained from skull base to the vertex.    Coronal and sagittal reconstructions obtained from the axial data.    Automatic exposure control was utilized to limit radiation dose.    Contrast: None    Radiation Dose:    Total DLP: 18 80 mGy*cm    COMPARISON:  Outside CT head dated 03/18/2025    FINDINGS:  There is stable trace scattered subarachnoid hemorrhage over the right cerebral hemisphere.  Patchy hypodensities in the subcortical and periventricular white matter likely represent chronic ischemic changes.    There is no significant mass effect or midline shift.  The basal cisterns are patent.  There is diffuse parenchymal volume loss.  The calvarium appears intact.  Impression: Stable trace scattered subarachnoid hemorrhage over the right cerebral hemisphere.    Electronically signed by: Xenia Linton  Date:    03/19/2025  Time:    10:43      Rossy Jacques MD  Department of Hospital Medicine   [facility]   03/21/2025

## 2025-03-22 PROCEDURE — 63600175 PHARM REV CODE 636 W HCPCS

## 2025-03-22 PROCEDURE — 21400001 HC TELEMETRY ROOM

## 2025-03-22 PROCEDURE — 25000003 PHARM REV CODE 250

## 2025-03-22 PROCEDURE — 25000003 PHARM REV CODE 250: Performed by: INTERNAL MEDICINE

## 2025-03-22 RX ADMIN — MUPIROCIN: 20 OINTMENT TOPICAL at 09:03

## 2025-03-22 RX ADMIN — LEVETIRACETAM 500 MG: 100 INJECTION, SOLUTION INTRAVENOUS at 02:03

## 2025-03-22 RX ADMIN — LEVETIRACETAM 500 MG: 100 INJECTION, SOLUTION INTRAVENOUS at 01:03

## 2025-03-22 RX ADMIN — CLONIDINE 1 PATCH: 0.3 PATCH TRANSDERMAL at 09:03

## 2025-03-22 RX ADMIN — HYDRALAZINE HYDROCHLORIDE 10 MG: 20 INJECTION INTRAMUSCULAR; INTRAVENOUS at 01:03

## 2025-03-22 NOTE — PROGRESS NOTES
[facility]  Hospital Medicine Progress Note        Chief Complaint: Inpatient Follow-up     HPI: Kenney Rose is a 88 yo male w PMHx of non-verbal dementia, HTN, HLD, PVD, and CAD s/p CABG x 1 on DAPT who was transferred from [facility] for possible cortical hemorrhage and neurosurgical services. Wife at bedside, states that patient would not want chest compressions in the event of cardiac arrest. Patient initially presented from his home to [facility] in [place] and subsequently transferred to [place]. Reportedly increased generalized weakness and difficulty standing from seated position from his baseline as well as slurred speech since this morning when he woke up. Last known normal was 2100 yesterday. Hypertensive upon initial presentation BP 230s/150s, was given Hydralazine 20mg x 1. CT head wo contrast consistent with generalized atrophy, chronic appearing small vessel ischemic changes, and 6.1 mm R parietal hyperdense area labelled as calcification vs cortical hemorrhage. CT chest negative for pneumothorax.      Following transfer to [facility], BP noted to be in 140s, however all VSS otherwise and afebrile. GCS 13. Neurosurgery was paged, recommended SBP goal <160 as unsure if this is a true hemorrhage. Patient was loaded on Keppra and given Labetalol 10mg x 1 in ED. Admitted to ICU for close monitoring. Seen by Palliative Medicine.     Being downgraded to Hospital medicine services during the evening hours of 3/19.  Blood pressure in 130s systolic/70s diastolic.  Afebrile.  Stable on room air.  Continued on Keppra.    Therapy evaluations ordered .No therapy indicated for physical therapy.  Seen by Neurology.  No further recommendations.  Neurosurgery also signed off.  Palliative Medicine was consulted further goals discussion.    Interval Hx:   Family wants nursing home placement with hospice.  Case management on board.      Patient was seen and examined.  Remains  sleepy.  Case discussed with nurse.  No overnight events reported.  Afebrile.  Blood pressure at goal    Objective/physical exam:  General: In no acute distress, afebrile  Chest: Clear to auscultation bilaterally  Heart:  +S1, S2, normal rate  Abdomen: Soft, nontender, BS +  MSK: Warm, no lower extremity edema, no clubbing or cyanosis  Neurologic: Alert , does not follow commands, able to move all extremities spontaneously.    VITAL SIGNS: 24 HRS MIN & MAX LAST   Temp  Min: 98 °F (36.7 °C)  Max: 99.6 °F (37.6 °C) 98 °F (36.7 °C)   BP  Min: 115/57  Max: 198/90 129/60   Pulse  Min: 69  Max: 105  72   Resp  Min: 18  Max: 24 20   SpO2  Min: 91 %  Max: 100 % 98 %     I have reviewed the following labs:  Recent Labs   Lab 03/19/25  0041   WBC 6.77   RBC 4.49*   HGB 13.3*   HCT 41.6*   MCV 92.7   MCH 29.6   MCHC 32.0*   RDW 15.8   *   MPV 12.7*     Recent Labs   Lab 03/19/25  0040      K 3.5      CO2 19*   BUN 13.0   CREATININE 0.86   CALCIUM 8.5*   MG 2.00   ALBUMIN 3.5   ALKPHOS 78   ALT 11   AST 36*   BILITOT 1.1     Microbiology Results (last 7 days)       ** No results found for the last 168 hours. **             See below for Radiology    Assessment/Plan:  Intraparenchymal /subarachnoid hemorrhage  Weakness/slurred speech  Hypertensive encephalopathy  Delirium superimposed on dementia  Mild thrombocytopenia    Plan  Holding any Antiplatelets/Anticoagulation  On Keppra  Blood pressure at goal.  Clonidine patch if needed while NPO to keep under 140 /90.  Also on p.r.ns .  Now transitioning to hospice however.  Neurosurgery and Neurology signed off.  Ok for pleasure feeds as now on hospice Palliative medicine coordinating family, choosing nursing home with Hospice.  Case management on board, coordinating with family.    VTE prophylaxis:  SCDs      Anticipated discharge and Disposition:   To be decided.  Discussions about hospice going on with the family      All diagnosis and differential diagnosis  have been reviewed; assessment and plan has been documented; I have personally reviewed the labs and test results that are presently available; I have reviewed the patients medication list; I have reviewed the consulting providers response and recommendations. I have reviewed or attempted to review medical records based upon their availability    All of the patient's questions have been  addressed and answered. Patient's is agreeable to the above stated plan. I will continue to monitor closely and make adjustments to medical management as needed.    Portions of this note dictated using EMR integrated voice recognition software, and may be subject to voice recognition errors not corrected at proofreading. Please contact writer for clarification if needed.   _____________________________________________________________________    Malnutrition Status:  Nutrition consulted. Most recent weight and BMI monitored-     Measurements:  Wt Readings from Last 1 Encounters:   03/19/25 92.4 kg (203 lb 11.3 oz)   Body mass index is 27.63 kg/m².    Patient has been screened and assessed by RD.    Malnutrition Type:  Context:    Level:      Malnutrition Characteristic Summary:       Interventions/Recommendations (treatment strategy):        Scheduled Med:   cloNIDine 0.3 mg/24 hr td ptwk  1 patch Transdermal Q7 Days    levETIRAcetam (Keppra) IV (PEDS and ADULTS)  500 mg Intravenous Q12H    levothyroxine  50 mcg Oral Before breakfast    mupirocin   Nasal BID      Continuous Infusions:       PRN Meds:    Current Facility-Administered Medications:     glycopyrrolate, 0.1 mg, Intravenous, TID PRN    hydrALAZINE, 10 mg, Intravenous, Q4H PRN    labetalol, 10 mg, Intravenous, Q4H PRN    lorazepam, 0.5 mg, Intravenous, Q30 Min PRN    lorazepam, 1 mg, Intravenous, Q4H PRN    morphine, 2 mg, Intravenous, Q4H PRN    sodium chloride 0.9%, 10 mL, Intravenous, PRN     Radiology:  I have personally reviewed the following imaging and agree with the  radiologist.     CT Head Without Contrast  Narrative: EXAMINATION:  CT HEAD WITHOUT CONTRAST    CLINICAL HISTORY:  cortical hemorrhage versus calcification;    TECHNIQUE:  Axial scans were obtained from skull base to the vertex.    Coronal and sagittal reconstructions obtained from the axial data.    Automatic exposure control was utilized to limit radiation dose.    Contrast: None    Radiation Dose:    Total DLP: 18 80 mGy*cm    COMPARISON:  Outside CT head dated 03/18/2025    FINDINGS:  There is stable trace scattered subarachnoid hemorrhage over the right cerebral hemisphere.  Patchy hypodensities in the subcortical and periventricular white matter likely represent chronic ischemic changes.    There is no significant mass effect or midline shift.  The basal cisterns are patent.  There is diffuse parenchymal volume loss.  The calvarium appears intact.  Impression: Stable trace scattered subarachnoid hemorrhage over the right cerebral hemisphere.    Electronically signed by: Xenia Linton  Date:    03/19/2025  Time:    10:43      Rossy Jacques MD  Department of Hospital Medicine   [facility]   03/22/2025

## 2025-03-22 NOTE — PLAN OF CARE
Problem: Adult Inpatient Plan of Care  Goal: Plan of Care Review  Outcome: Progressing  Goal: Patient-Specific Goal (Individualized)  Outcome: Progressing  Goal: Absence of Hospital-Acquired Illness or Injury  Outcome: Progressing  Goal: Optimal Comfort and Wellbeing  Outcome: Progressing  Goal: Readiness for Transition of Care  Outcome: Progressing     Problem: Skin Injury Risk Increased  Goal: Skin Health and Integrity  Outcome: Progressing     Problem: Fall Injury Risk  Goal: Absence of Fall and Fall-Related Injury  Outcome: Progressing     Problem: Delirium  Goal: Optimal Coping  Outcome: Progressing  Goal: Improved Behavioral Control  Outcome: Progressing  Goal: Improved Attention and Thought Clarity  Outcome: Progressing  Goal: Improved Sleep  Outcome: Progressing     Problem: Coping Ineffective  Goal: Effective Coping  Outcome: Progressing     Problem: Palliative Care  Goal: Enhanced Quality of Life  Outcome: Progressing     Problem: Wound  Goal: Optimal Coping  Outcome: Progressing  Goal: Optimal Functional Ability  Outcome: Progressing  Goal: Absence of Infection Signs and Symptoms  Outcome: Progressing  Goal: Improved Oral Intake  Outcome: Progressing  Goal: Optimal Pain Control and Function  Outcome: Progressing  Goal: Skin Health and Integrity  Outcome: Progressing  Goal: Optimal Wound Healing  Outcome: Progressing

## 2025-03-23 LAB — POCT GLUCOSE: 74 MG/DL (ref 70–110)

## 2025-03-23 PROCEDURE — 63600175 PHARM REV CODE 636 W HCPCS

## 2025-03-23 PROCEDURE — 25000003 PHARM REV CODE 250

## 2025-03-23 PROCEDURE — 21400001 HC TELEMETRY ROOM

## 2025-03-23 RX ADMIN — MUPIROCIN: 20 OINTMENT TOPICAL at 08:03

## 2025-03-23 RX ADMIN — HYDRALAZINE HYDROCHLORIDE 10 MG: 20 INJECTION INTRAMUSCULAR; INTRAVENOUS at 10:03

## 2025-03-23 RX ADMIN — HYDRALAZINE HYDROCHLORIDE 10 MG: 20 INJECTION INTRAMUSCULAR; INTRAVENOUS at 07:03

## 2025-03-23 RX ADMIN — HYDRALAZINE HYDROCHLORIDE 10 MG: 20 INJECTION INTRAMUSCULAR; INTRAVENOUS at 04:03

## 2025-03-23 RX ADMIN — LABETALOL HYDROCHLORIDE 10 MG: 5 INJECTION, SOLUTION INTRAVENOUS at 05:03

## 2025-03-23 RX ADMIN — MUPIROCIN: 20 OINTMENT TOPICAL at 07:03

## 2025-03-23 RX ADMIN — LEVETIRACETAM 500 MG: 100 INJECTION, SOLUTION INTRAVENOUS at 02:03

## 2025-03-23 NOTE — PROGRESS NOTES
[facility]  Hospital Medicine Progress Note        Chief Complaint: Inpatient Follow-up     HPI: Kenney Rose is a 88 yo male w PMHx of non-verbal dementia, HTN, HLD, PVD, and CAD s/p CABG x 1 on DAPT who was transferred from [facility] for possible cortical hemorrhage and neurosurgical services. Wife at bedside, states that patient would not want chest compressions in the event of cardiac arrest. Patient initially presented from his home to [facility] in [place] and subsequently transferred to [place]. Reportedly increased generalized weakness and difficulty standing from seated position from his baseline as well as slurred speech since this morning when he woke up. Last known normal was 2100 yesterday. Hypertensive upon initial presentation BP 230s/150s, was given Hydralazine 20mg x 1. CT head wo contrast consistent with generalized atrophy, chronic appearing small vessel ischemic changes, and 6.1 mm R parietal hyperdense area labelled as calcification vs cortical hemorrhage. CT chest negative for pneumothorax.      Following transfer to [facility], BP noted to be in 140s, however all VSS otherwise and afebrile. GCS 13. Neurosurgery was paged, recommended SBP goal <160 as unsure if this is a true hemorrhage. Patient was loaded on Keppra and given Labetalol 10mg x 1 in ED. Admitted to ICU for close monitoring. Seen by Palliative Medicine.     Being downgraded to Hospital medicine services during the evening hours of 3/19.  Blood pressure in 130s systolic/70s diastolic.  Afebrile.  Stable on room air.  Continued on Keppra.    Therapy evaluations ordered .No therapy indicated for physical therapy.  Seen by Neurology.  No further recommendations.  Neurosurgery also signed off.  Palliative Medicine was consulted further goals discussion.    Interval Hx:   Family wants nursing home placement with hospice.  Case management on board.      Patient was seen and examined.  Remains  altered.  Case discussed with nurse.  No overnight events reported.  Chart was reviewed.  Blood pressure elevation noted.    Objective/physical exam:  General: In no acute distress, afebrile  Chest: Clear to auscultation bilaterally  Heart:  +S1, S2, normal rate  Abdomen: Soft, nontender, BS +  MSK: Warm, no lower extremity edema, no clubbing or cyanosis  Neurologic: Alert , does not follow commands, able to move all extremities spontaneously.    VITAL SIGNS: 24 HRS MIN & MAX LAST   Temp  Min: 97.5 °F (36.4 °C)  Max: 99.3 °F (37.4 °C) 97.5 °F (36.4 °C)   BP  Min: 129/60  Max: 221/96 (!) 150/62   Pulse  Min: 70  Max: 112  72   Resp  Min: 18  Max: 19 18   SpO2  Min: 93 %  Max: 98 % 97 %     I have reviewed the following labs:  Recent Labs   Lab 03/19/25  0041   WBC 6.77   RBC 4.49*   HGB 13.3*   HCT 41.6*   MCV 92.7   MCH 29.6   MCHC 32.0*   RDW 15.8   *   MPV 12.7*     Recent Labs   Lab 03/19/25  0040      K 3.5      CO2 19*   BUN 13.0   CREATININE 0.86   CALCIUM 8.5*   MG 2.00   ALBUMIN 3.5   ALKPHOS 78   ALT 11   AST 36*   BILITOT 1.1     Microbiology Results (last 7 days)       ** No results found for the last 168 hours. **             See below for Radiology    Assessment/Plan:  Intraparenchymal /subarachnoid hemorrhage  Weakness/slurred speech  Hypertensive encephalopathy  Delirium superimposed on dementia  Mild thrombocytopenia    Plan  Holding any Antiplatelets/Anticoagulation .  On Keppra .  Blood pressure at goal.  Clonidine patch if needed while NPO to keep under 140 /90.  Also on p.r.ns IV if needed.  Now transitioning to hospice , requesting nursing home with hospice.  Neurosurgery and Neurology signed off.  Ok for pleasure feeds as now on hospice if able to.  Palliative medicine coordinating family, choosing nursing home with Hospice.  Case management on board, coordinating with family.    VTE prophylaxis:  SCDs    Anticipated discharge and Disposition:   [facility] with pleasure feeds  as long as he can live      All diagnosis and differential diagnosis have been reviewed; assessment and plan has been documented; I have personally reviewed the labs and test results that are presently available; I have reviewed the patients medication list; I have reviewed the consulting providers response and recommendations. I have reviewed or attempted to review medical records based upon their availability    All of the patient's questions have been  addressed and answered. Patient's is agreeable to the above stated plan. I will continue to monitor closely and make adjustments to medical management as needed.    Portions of this note dictated using EMR integrated voice recognition software, and may be subject to voice recognition errors not corrected at proofreading. Please contact writer for clarification if needed.   _____________________________________________________________________    Malnutrition Status:  Nutrition consulted. Most recent weight and BMI monitored-     Measurements:  Wt Readings from Last 1 Encounters:   03/19/25 92.4 kg (203 lb 11.3 oz)   Body mass index is 27.63 kg/m².    Patient has been screened and assessed by RD.    Malnutrition Type:  Context:    Level:      Malnutrition Characteristic Summary:       Interventions/Recommendations (treatment strategy):        Scheduled Med:   cloNIDine 0.3 mg/24 hr td ptwk  1 patch Transdermal Q7 Days    levETIRAcetam (Keppra) IV (PEDS and ADULTS)  500 mg Intravenous Q12H    levothyroxine  50 mcg Oral Before breakfast    mupirocin   Nasal BID      Continuous Infusions:       PRN Meds:    Current Facility-Administered Medications:     glycopyrrolate, 0.1 mg, Intravenous, TID PRN    hydrALAZINE, 10 mg, Intravenous, Q4H PRN    labetalol, 10 mg, Intravenous, Q4H PRN    lorazepam, 0.5 mg, Intravenous, Q30 Min PRN    lorazepam, 1 mg, Intravenous, Q4H PRN    morphine, 2 mg, Intravenous, Q4H PRN    sodium chloride 0.9%, 10 mL, Intravenous, PRN      Radiology:  I have personally reviewed the following imaging and agree with the radiologist.     CT Head Without Contrast  Narrative: EXAMINATION:  CT HEAD WITHOUT CONTRAST    CLINICAL HISTORY:  cortical hemorrhage versus calcification;    TECHNIQUE:  Axial scans were obtained from skull base to the vertex.    Coronal and sagittal reconstructions obtained from the axial data.    Automatic exposure control was utilized to limit radiation dose.    Contrast: None    Radiation Dose:    Total DLP: 18 80 mGy*cm    COMPARISON:  Outside CT head dated 03/18/2025    FINDINGS:  There is stable trace scattered subarachnoid hemorrhage over the right cerebral hemisphere.  Patchy hypodensities in the subcortical and periventricular white matter likely represent chronic ischemic changes.    There is no significant mass effect or midline shift.  The basal cisterns are patent.  There is diffuse parenchymal volume loss.  The calvarium appears intact.  Impression: Stable trace scattered subarachnoid hemorrhage over the right cerebral hemisphere.    Electronically signed by: Xenia Linton  Date:    03/19/2025  Time:    10:43      Rossy Jacques MD  Department of Hospital Medicine   [facility]   03/23/2025

## 2025-03-24 PROCEDURE — 63600175 PHARM REV CODE 636 W HCPCS

## 2025-03-24 PROCEDURE — 25000003 PHARM REV CODE 250

## 2025-03-24 PROCEDURE — 63600175 PHARM REV CODE 636 W HCPCS: Performed by: INTERNAL MEDICINE

## 2025-03-24 PROCEDURE — 21400001 HC TELEMETRY ROOM

## 2025-03-24 RX ORDER — MORPHINE SULFATE 4 MG/ML
4 INJECTION, SOLUTION INTRAMUSCULAR; INTRAVENOUS EVERY 4 HOURS PRN
Refills: 0 | Status: DISCONTINUED | OUTPATIENT
Start: 2025-03-24 | End: 2025-03-25 | Stop reason: HOSPADM

## 2025-03-24 RX ORDER — MORPHINE SULFATE 4 MG/ML
1 INJECTION, SOLUTION INTRAMUSCULAR; INTRAVENOUS ONCE
Status: COMPLETED | OUTPATIENT
Start: 2025-03-24 | End: 2025-03-24

## 2025-03-24 RX ADMIN — HYDRALAZINE HYDROCHLORIDE 10 MG: 20 INJECTION INTRAMUSCULAR; INTRAVENOUS at 05:03

## 2025-03-24 RX ADMIN — HYDRALAZINE HYDROCHLORIDE 10 MG: 20 INJECTION INTRAMUSCULAR; INTRAVENOUS at 08:03

## 2025-03-24 RX ADMIN — LEVETIRACETAM 500 MG: 100 INJECTION, SOLUTION INTRAVENOUS at 02:03

## 2025-03-24 RX ADMIN — HYDRALAZINE HYDROCHLORIDE 10 MG: 20 INJECTION INTRAMUSCULAR; INTRAVENOUS at 04:03

## 2025-03-24 RX ADMIN — MORPHINE SULFATE 4 MG: 4 INJECTION INTRAVENOUS at 09:03

## 2025-03-24 RX ADMIN — MORPHINE SULFATE 1 MG: 4 INJECTION INTRAVENOUS at 03:03

## 2025-03-24 NOTE — PROGRESS NOTES
Inpatient Nutrition Assessment    Admit Date: 3/18/2025   Total duration of encounter: 6 days   Patient Age: 89 y.o.    Nutrition Recommendation/Prescription     Recommend pleasure feeds as tolerated  Can trial ONS as tolerated once diet advanced  If aggressive measures desired, recommend EN. Consult RD for additional recommendations as medically appropriate.    Communication of Recommendations:  EMR    Nutrition Assessment     Malnutrition Assessment/Nutrition-Focused Physical Exam       Malnutrition Level: other (see comments) (Does not meet criteria) (03/24/25 1428)  Energy Intake (Malnutrition): less than or equal to 50% for greater than or equal to 5 days (03/24/25 1428)  Weight Loss (Malnutrition): other (see comments) (Does not meet criteria) (03/24/25 1428)                                         Fluid Accumulation (Malnutrition): other (see comments) (Unable to assess) (03/24/25 1428)        A minimum of two characteristics is recommended for diagnosis of either severe or non-severe malnutrition.    Chart Review    Reason Seen: continuous nutrition monitoring    Malnutrition Screening Tool Results   Have you recently lost weight without trying?: No  Have you been eating poorly because of a decreased appetite?: No   MST Score: 0   Diagnosis:  Intraparenchymal /subarachnoid hemorrhage  Weakness/slurred speech  Hypertensive encephalopathy  Delirium superimposed on dementia  Mild thrombocytopenia    Relevant Medical History: non-verbal dementia, HTN, HLD, PVD, and CAD s/p CABG x 1 on DAPT     Scheduled Medications:  cloNIDine 0.3 mg/24 hr td ptwk, 1 patch, Q7 Days  levETIRAcetam (Keppra) IV (PEDS and ADULTS), 500 mg, Q12H  levothyroxine, 50 mcg, Before breakfast  morphine, 1 mg, Once    Continuous Infusions:   PRN Medications:  glycopyrrolate, 0.1 mg, TID PRN  hydrALAZINE, 10 mg, Q4H PRN  labetalol, 10 mg, Q4H PRN  lorazepam, 0.5 mg, Q30 Min PRN  lorazepam, 1 mg, Q4H PRN  morphine, 2 mg, Q4H PRN  morphine, 4  mg, Q4H PRN  sodium chloride 0.9%, 10 mL, PRN    Calorie Containing IV Medications: no significant kcals from medications at this time    Recent Labs   Lab 25  0040 25  0041     --    K 3.5  --    CALCIUM 8.5*  --    PHOS 2.2*  --    MG 2.00  --      --    CO2 19*  --    BUN 13.0  --    CREATININE 0.86  --    EGFRNORACEVR >60  --    GLUCOSE 81*  --    BILITOT 1.1  --    ALKPHOS 78  --    ALT 11  --    AST 36*  --    ALBUMIN 3.5  --    CRP 1.20  --    WBC  --  6.77   HGB  --  13.3*   HCT  --  41.6*     Nutrition Orders:  Diet NPO      Appetite/Oral Intake: NPO/NPO  Factors Affecting Nutritional Intake: impaired cognitive status/motor control and NPO  Social Needs Impacting Access to Food: unable to assess at this time; will attempt on follow-up  Food/[place]/Cultural Preferences: unable to obtain  Food Allergies: unable to obtain  Last Bowel Movement: 25  Wound(s):     Wound 25 1530 Pressure Injury midline Sacral spine #1-Tissue loss description: Partial thickness     Comments    3/24/25: Pt asleep at time of rounds, no family present in room. Pt has been NPO>4 days 2/2 mental status. Family wanting hospice, recommend pleasure feeds as tolerated. 7.5% weight loss in 5 months (insignificant).     Anthropometrics    Height: 6' (182.9 cm), Height Method: Estimated  Last Weight: 88.9 kg (195 lb 15.8 oz) (25 0552), Weight Method: Bed Scale  BMI (Calculated): 26.6  BMI Classification: overweight (BMI 25-29.9)        Ideal Body Weight (IBW), Male: 178 lb     % Ideal Body Weight, Male (lb): 101.12 %                 Usual Body Weight (UBW), k.3 kg  % Usual Body Weight: 92.51     Usual Weight Provided By: EMR weight history    Wt Readings from Last 5 Encounters:   25 88.9 kg (195 lb 15.8 oz)     Weight Change(s) Since Admission:   Wt Readings from Last 1 Encounters:   25 05 88.9 kg (195 lb 15.8 oz)   25 1359 92.4 kg (203 lb 11.3 oz)   25 2153 81.6 kg  (180 lb)   Admit Weight: 81.6 kg (180 lb) (03/18/25 2153), Weight Method: Estimated    Estimated Needs    Weight Used For Calorie Calculations: 88.9 kg (195 lb 15.8 oz)  Energy Calorie Requirements (kcal): 1916 kcal (1.2 SF)  Energy Need Method: Rowe-St Jeor  Weight Used For Protein Calculations: 88.9 kg (195 lb 15.8 oz)  Protein Requirements:  gm (1.0-1.1 gm/kg)  Fluid Requirements (mL): 1916 mL (1 mL/kcal)        Enteral Nutrition     Patient not receiving enteral nutrition at this time.    Parenteral Nutrition     Patient not receiving parenteral nutrition support at this time.    Evaluation of Received Nutrient Intake    Calories: not meeting estimated needs  Protein: not meeting estimated needs    Patient Education     Not applicable.    Nutrition Diagnosis     PES: Inadequate oral intake related to acute illness as evidenced by NPO since admit. (new)     Nutrition Interventions     Intervention(s): general/healthful diet, commercial beverage, and collaboration with other providers    Goal: Meet greater than 80% of nutritional needs by follow-up. (new)    Nutrition Goals & Monitoring     Dietitian will monitor: food and beverage intake, weight, and gastrointestinal profile  Discharge planning: too early to determine; pending clinical course  Nutrition Risk/Follow-Up: high (follow-up in 1-4 days)   Please consult if re-assessment needed sooner.

## 2025-03-24 NOTE — PROGRESS NOTES
[facility]  Hospital Medicine Progress Note        Chief Complaint: Inpatient Follow-up     HPI: Kenney Rose is a 89 yo male w PMHx of non-verbal dementia, HTN, HLD, PVD, and CAD s/p CABG x 1 on DAPT who was transferred from [facility] for possible cortical hemorrhage and neurosurgical services. Wife at bedside, states that patient would not want chest compressions in the event of cardiac arrest. Patient initially presented from his home to [facility] in [place] and subsequently transferred to [place]. Reportedly increased generalized weakness and difficulty standing from seated position from his baseline as well as slurred speech since this morning when he woke up. Last known normal was 2100 yesterday. Hypertensive upon initial presentation BP 230s/150s, was given Hydralazine 20mg x 1. CT head wo contrast consistent with generalized atrophy, chronic appearing small vessel ischemic changes, and 6.1 mm R parietal hyperdense area labelled as calcification vs cortical hemorrhage. CT chest negative for pneumothorax.      Following transfer to [facility], BP noted to be in 140s, however all VSS otherwise and afebrile. GCS 13. Neurosurgery was paged, recommended SBP goal <160 as unsure if this is a true hemorrhage. Patient was loaded on Keppra and given Labetalol 10mg x 1 in ED. Admitted to ICU for close monitoring. Seen by Palliative Medicine.     Being downgraded to Hospital medicine services during the evening hours of 3/19.  Blood pressure in 130s systolic/70s diastolic.  Afebrile.  Stable on room air.  Continued on Keppra.    Therapy evaluations ordered .No therapy indicated for physical therapy.  Seen by Neurology.  No further recommendations.  Neurosurgery also signed off.  Palliative Medicine was consulted further goals discussion.    Interval Hx:   Family wants nursing home placement with hospice.  Case management on board.      Patient was seen and examined.  Remains  altered.  Case discussed with nurse.  Chart was reviewed.  Blood pressure elevation noted.    Objective/physical exam:  General: In no acute distress, afebrile  Chest: Clear to auscultation bilaterally  Heart:  +S1, S2, normal rate  Abdomen: Soft, nontender, BS +  MSK: Warm, no lower extremity edema, no clubbing or cyanosis  Neurologic: Alert , does not follow commands, able to move all extremities spontaneously.    VITAL SIGNS: 24 HRS MIN & MAX LAST   Temp  Min: 97.6 °F (36.4 °C)  Max: 98.7 °F (37.1 °C) 97.6 °F (36.4 °C)   BP  Min: 145/56  Max: 199/74 (!) 166/69   Pulse  Min: 64  Max: 86  65   Resp  Min: 17  Max: 20 17   SpO2  Min: 96 %  Max: 98 % 97 %     I have reviewed the following labs:  Recent Labs   Lab 03/19/25  0041   WBC 6.77   RBC 4.49*   HGB 13.3*   HCT 41.6*   MCV 92.7   MCH 29.6   MCHC 32.0*   RDW 15.8   *   MPV 12.7*     Recent Labs   Lab 03/19/25  0040      K 3.5      CO2 19*   BUN 13.0   CREATININE 0.86   CALCIUM 8.5*   MG 2.00   ALBUMIN 3.5   ALKPHOS 78   ALT 11   AST 36*   BILITOT 1.1     Microbiology Results (last 7 days)       ** No results found for the last 168 hours. **             See below for Radiology    Assessment/Plan:  Intraparenchymal /subarachnoid hemorrhage  Weakness/slurred speech  Hypertensive encephalopathy  Delirium superimposed on dementia  Mild thrombocytopenia    Plan  Holding any Antiplatelets/Anticoagulation .  On Keppra .  Clonidine patch if needed while NPO to keep under 140 /90.  Also on p.r.ns IV if needed.  Now transitioning to hospice .  Neurosurgery and Neurology signed off.  Ok for pleasure feeds as now on hospice if able to.  Palliative medicine coordinating family, choosing nursing home with Hospice.    Case management on board.  Started working.  Staff to check with palliative Medicine about nutrition if going to nursing home as he remains not a candidate even for pleasure feeds at this point of time.    VTE prophylaxis:  SCDs    Anticipated  discharge and Disposition:   NH-Hospice    All diagnosis and differential diagnosis have been reviewed; assessment and plan has been documented; I have personally reviewed the labs and test results that are presently available; I have reviewed the patients medication list; I have reviewed the consulting providers response and recommendations. I have reviewed or attempted to review medical records based upon their availability    All of the patient's questions have been  addressed and answered. Patient's is agreeable to the above stated plan. I will continue to monitor closely and make adjustments to medical management as needed.    Portions of this note dictated using EMR integrated voice recognition software, and may be subject to voice recognition errors not corrected at proofreading. Please contact writer for clarification if needed.   _____________________________________________________________________    Malnutrition Status:  Nutrition consulted. Most recent weight and BMI monitored-     Measurements:  Wt Readings from Last 1 Encounters:   03/24/25 88.9 kg (195 lb 15.8 oz)   Body mass index is 26.58 kg/m².    Patient has been screened and assessed by RD.    Malnutrition Type:  Context:    Level:      Malnutrition Characteristic Summary:       Interventions/Recommendations (treatment strategy):        Scheduled Med:   cloNIDine 0.3 mg/24 hr td ptwk  1 patch Transdermal Q7 Days    levETIRAcetam (Keppra) IV (PEDS and ADULTS)  500 mg Intravenous Q12H    levothyroxine  50 mcg Oral Before breakfast      Continuous Infusions:       PRN Meds:    Current Facility-Administered Medications:     glycopyrrolate, 0.1 mg, Intravenous, TID PRN    hydrALAZINE, 10 mg, Intravenous, Q4H PRN    labetalol, 10 mg, Intravenous, Q4H PRN    lorazepam, 0.5 mg, Intravenous, Q30 Min PRN    lorazepam, 1 mg, Intravenous, Q4H PRN    morphine, 2 mg, Intravenous, Q4H PRN    sodium chloride 0.9%, 10 mL, Intravenous, PRN     Radiology:  I have  personally reviewed the following imaging and agree with the radiologist.     CT Head Without Contrast  Narrative: EXAMINATION:  CT HEAD WITHOUT CONTRAST    CLINICAL HISTORY:  cortical hemorrhage versus calcification;    TECHNIQUE:  Axial scans were obtained from skull base to the vertex.    Coronal and sagittal reconstructions obtained from the axial data.    Automatic exposure control was utilized to limit radiation dose.    Contrast: None    Radiation Dose:    Total DLP: 18 80 mGy*cm    COMPARISON:  Outside CT head dated 03/18/2025    FINDINGS:  There is stable trace scattered subarachnoid hemorrhage over the right cerebral hemisphere.  Patchy hypodensities in the subcortical and periventricular white matter likely represent chronic ischemic changes.    There is no significant mass effect or midline shift.  The basal cisterns are patent.  There is diffuse parenchymal volume loss.  The calvarium appears intact.  Impression: Stable trace scattered subarachnoid hemorrhage over the right cerebral hemisphere.    Electronically signed by: Xenia Linton  Date:    03/19/2025  Time:    10:43      Rossy Jacques MD  Department of Hospital Medicine   [facility]   03/24/2025

## 2025-03-24 NOTE — PLAN OF CARE
Problem: Adult Inpatient Plan of Care  Goal: Absence of Hospital-Acquired Illness or Injury  Outcome: Progressing  Goal: Optimal Comfort and Wellbeing  Outcome: Progressing  Goal: Readiness for Transition of Care  Outcome: Progressing     Problem: Skin Injury Risk Increased  Goal: Skin Health and Integrity  Outcome: Progressing     Problem: Fall Injury Risk  Goal: Absence of Fall and Fall-Related Injury  Outcome: Progressing

## 2025-03-25 VITALS
TEMPERATURE: 97 F | SYSTOLIC BLOOD PRESSURE: 148 MMHG | HEART RATE: 68 BPM | OXYGEN SATURATION: 95 % | RESPIRATION RATE: 18 BRPM | BODY MASS INDEX: 26.12 KG/M2 | WEIGHT: 192.88 LBS | HEIGHT: 72 IN | DIASTOLIC BLOOD PRESSURE: 82 MMHG

## 2025-03-25 PROCEDURE — 63600175 PHARM REV CODE 636 W HCPCS

## 2025-03-25 PROCEDURE — 25000003 PHARM REV CODE 250

## 2025-03-25 RX ORDER — CLONIDINE 0.3 MG/24H
1 PATCH, EXTENDED RELEASE TRANSDERMAL
Start: 2025-03-29

## 2025-03-25 RX ORDER — GLYCOPYRROLATE 0.2 MG/ML
0.1 INJECTION INTRAMUSCULAR; INTRAVENOUS 3 TIMES DAILY PRN
Start: 2025-03-25

## 2025-03-25 RX ORDER — LEVETIRACETAM 500 MG/1
500 TABLET ORAL 2 TIMES DAILY
Start: 2025-03-25 | End: 2025-03-25 | Stop reason: HOSPADM

## 2025-03-25 RX ADMIN — LEVETIRACETAM 500 MG: 100 INJECTION, SOLUTION INTRAVENOUS at 02:03

## 2025-03-25 RX ADMIN — MORPHINE SULFATE 4 MG: 4 INJECTION INTRAVENOUS at 05:03

## 2025-03-25 RX ADMIN — MORPHINE SULFATE 4 MG: 4 INJECTION INTRAVENOUS at 11:03

## 2025-03-25 RX ADMIN — HYDRALAZINE HYDROCHLORIDE 10 MG: 20 INJECTION INTRAMUSCULAR; INTRAVENOUS at 08:03

## 2025-03-25 NOTE — PROGRESS NOTES
Patient Name: Kenney Rose   MRN: [id number]   Admission Date: 3/18/2025   Hospital Length of Stay: 7   Attending Provider: Rossy Jacques MD   Consulting Provider: Mesha WASHINGTON  Reason for Consult: Goals of Care  Primary Care Physician:  Magdi Walton MD     Principal Problem: <principal problem not specified>       Final diagnoses:  [I67.4] Hypertensive encephalopathy (Primary)  [R53.1] Generalized weakness  [I61.9] Intraparenchymal hemorrhage of brain - Possible  [F05] Delirium superimposed on dementia      Assessment/Plan:     I reviewed the patient and family's understanding of the seriousness of the illness and its expected prognosis. We discussed the patient's goals of care and treatment preferences.        We have discussed in-patient hospice with family. He should meet criteria as he is requiring IV medications including pain medicine, antihypertensives, and keppra.     I discussed with case management. She will be reaching out to family and hospice house today.     Patient appears comfortable current. Continue IV morphine for pain and dyspnea.    Recommendations:     In patient hospice referral      Interval History:     Events over the weekend reviewed. Patient has had some issues with hypertension and is receiving IV antihypertensives. He is also receiving IV keppra. He is unable to eat and has no access for enteral nutrition. He remains extremely obtunded. I was notified by the hospitalist that this may be a concern for the nursing home. It appears the patient is continuing to decline. Palliative RN called and spoke with son, he confirms they do not want a PEG tube and to continue comfort orders.      Active Ambulatory Problems     Diagnosis Date Noted    No Active Ambulatory Problems     Resolved Ambulatory Problems     Diagnosis Date Noted    No Resolved Ambulatory Problems     No Additional Past Medical History        No past surgical history on file.     Review of patient's allergies  indicates:   Allergen Reactions    Penicillins         Current Medications[1]       Current Facility-Administered Medications:     glycopyrrolate, 0.1 mg, Intravenous, TID PRN    hydrALAZINE, 10 mg, Intravenous, Q4H PRN    labetalol, 10 mg, Intravenous, Q4H PRN    lorazepam, 0.5 mg, Intravenous, Q30 Min PRN    lorazepam, 1 mg, Intravenous, Q4H PRN    morphine, 2 mg, Intravenous, Q4H PRN    morphine, 4 mg, Intravenous, Q4H PRN    sodium chloride 0.9%, 10 mL, Intravenous, PRN     No family history on file.       Review of Systems   Unable to perform ROS: Patient nonverbal            Objective:   BP (!) 174/66   Pulse 79   Temp 97.9 °F (36.6 °C) (Axillary)   Resp 16   Ht 6' (1.829 m)   Wt 87.5 kg (192 lb 14.4 oz)   SpO2 96%   BMI 26.16 kg/m²      Physical Exam   Constitutional: He appears ill.   HENT:   Head: Normocephalic and atraumatic.   Cardiovascular: Normal rate. Pulmonary:      Breath sounds: Decreased breath sounds present.     Neurological: He is unresponsive.            Review of Symptoms      Symptom Assessment (ESAS 0-10 Scale)  Pain:  0  Dyspnea:  0  Anxiety:  0  Nausea:  0  Depression:  0  Anorexia:  0  Fatigue:  0  Insomnia:  0  Restlessness:  0  Agitation:  0         Living Arrangements:  Lives with family    Psychosocial/Cultural:   See Palliative Psychosocial Note: Yes  **Primary  to Follow**  Palliative Care  Consult: No      Advance Care Planning   Advance Directives:   Goals of Care: What is most important right now is to focus on avoiding the hospital, improvement in condition but with limits to invasive therapies, comfort and QOL . Accordingly, we have decided that the best plan to meet the patient's goals includes enrolling in hospice care.          PAINAD: NA    Caregiver burden formerly assessed: Yes      Admission on 03/18/2025   Component Date Value    Sodium 03/19/2025 142     Potassium 03/19/2025 3.5     Chloride 03/19/2025 107     CO2 03/19/2025 19 (L)      Glucose 03/19/2025 81 (L)     Blood Urea Nitrogen 03/19/2025 13.0     Creatinine 03/19/2025 0.86     Calcium 03/19/2025 8.5 (L)     Protein Total 03/19/2025 6.7     Albumin 03/19/2025 3.5     Globulin 03/19/2025 3.2     Albumin/Globulin Ratio 03/19/2025 1.1     Bilirubin Total 03/19/2025 1.1     ALP 03/19/2025 78     ALT 03/19/2025 11     AST 03/19/2025 36 (H)     eGFR 03/19/2025 >60     Anion Gap 03/19/2025 16.0     BUN/Creatinine Ratio 03/19/2025 15     Magnesium Level 03/19/2025 2.00     Phosphorus Level 03/19/2025 2.2 (L)     PT 03/19/2025 14.0     INR 03/19/2025 1.1     PTT 03/19/2025 26.3     HIV 03/19/2025 Nonreactive     c-ANCA 03/19/2025 Negative     p-ANCA 03/19/2025 Negative     Antinuclear Ab, HEp-2 Cates* 03/19/2025 <1:80 (Negative)     Syphilis Antibody 03/19/2025 Nonreactive     CRP 03/19/2025 1.20     Sed Rate 03/19/2025 20     TSH 03/19/2025 3.396     WBC 03/19/2025 6.77     RBC 03/19/2025 4.49 (L)     Hgb 03/19/2025 13.3 (L)     Hct 03/19/2025 41.6 (L)     MCV 03/19/2025 92.7     MCH 03/19/2025 29.6     MCHC 03/19/2025 32.0 (L)     RDW 03/19/2025 15.8     Platelet 03/19/2025 123 (L)     MPV 03/19/2025 12.7 (H)     Neut % 03/19/2025 84.6     Lymph % 03/19/2025 10.3     Mono % 03/19/2025 4.6     Eos % 03/19/2025 0.0     Basophil % 03/19/2025 0.4     Imm Grans % 03/19/2025 0.1     Neut # 03/19/2025 5.72     Lymph # 03/19/2025 0.70     Mono # 03/19/2025 0.31     Eos # 03/19/2025 0.00     Baso # 03/19/2025 0.03     Imm Gran # 03/19/2025 0.01     NRBC% 03/19/2025 0.0     QRS Duration 03/19/2025 76     [facility] QTC Calculation 03/19/2025 477     POCT Glucose 03/23/2025 74             > 50% of 35 min of encounter was spent in chart review, face to face discussion of goals of care, symptom assessment, coordination of care and emotional support.    Mesha Molina, FELIX  Palliative Medicine  [facility]         [1]   Current Facility-Administered Medications:     cloNIDine 0.3 mg/24 hr td ptwk 1 patch,  1 patch, Transdermal, Q7 Days, Rossy Jacques MD, 1 patch at 03/22/25 0900    glycopyrrolate injection 0.1 mg, 0.1 mg, Intravenous, TID PRN, Mesha Cohen, FNP    hydrALAZINE injection 10 mg, 10 mg, Intravenous, Q4H PRN, Ruben Joshi DO, 10 mg at 03/25/25 0815    labetaloL injection 10 mg, 10 mg, Intravenous, Q4H PRN, Ruben Joshi DO, 10 mg at 03/23/25 1727    levETIRAcetam (Keppra) 500 mg in D5W 100 mL IVPB (MB+), 500 mg, Intravenous, Q12H, Janneth Howard MD, Stopped at 03/25/25 0230    levothyroxine tablet 50 mcg, 50 mcg, Oral, Before breakfast, Rossy Jacques MD    LORazepam injection 0.5 mg, 0.5 mg, Intravenous, Q30 Min PRN, Mesha Cohen, FNP    LORazepam injection 1 mg, 1 mg, Intravenous, Q4H PRN, Mesha Cohen, FNP, 1 mg at 03/21/25 1211    morphine injection 2 mg, 2 mg, Intravenous, Q4H PRN, Mesha Cohen H, FNP    morphine injection 4 mg, 4 mg, Intravenous, Q4H PRN, Mesha Cohen, FNP, 4 mg at 03/24/25 2115    sodium chloride 0.9% flush 10 mL, 10 mL, Intravenous, PRN, Ruben Joshi DO

## 2025-03-25 NOTE — PLAN OF CARE
Inpatient hospice referral placed yesterday. Contacted patient's son Daniel to inform him of choices. Daniel agreed with [facility]. Daniel informed CM he is unable to be at patient's bedside today due to caring for his mother. This information was given in referral description to [facility]. Cindy with [facility] will contact CM once referral is reviewed.       Hospice nurse was able to assess the patient and contacted family. Patient has been accepted. Currently pending consents being signed electronically by family.

## 2025-03-25 NOTE — PLAN OF CARE
03/25/25 1542   Final Note   Assessment Type Final Discharge Note   Anticipated Discharge Disposition [facility]iceMed[facility]   Post-Acute Status   Post-Acute Authorization Hospice   Hospice Status Set-up Complete/Auth obtained   Discharge Delays None known at this time     Patient has been accepted to [facility]. Currently waiting on family member to sign consent forms. Dc paperwork and AVS sent via [employer]. Patient has been placed in will call with [facility]. [facility] will contact nursing unit to receive report. Once report is called patient can be taken out of will call [phone].

## 2025-03-30 NOTE — DISCHARGE SUMMARY
[facility]  Hospital Medicine Discharge Summary    Admit Date: 3/18/2025  Discharge Date and Time: 3/25/25  Admitting Physician:  Team  Discharging Physician: Rossy Jacuqes MD.  Primary Care Physician: Magdi Walton MD  Consults: Neurology and Neurosurgery    Discharge Diagnoses:  Intraparenchymal /subarachnoid hemorrhage  Weakness/slurred speech  Hypertensive encephalopathy  Delirium superimposed on dementia  Mild thrombocytopenia    Hospital Course:   Kenney Rose is a 88 yo male w PMHx of non-verbal dementia, HTN, HLD, PVD, and CAD s/p CABG x 1 on DAPT who was transferred from [facility] for possible cortical hemorrhage and neurosurgical services. Wife at bedside, states that patient would not want chest compressions in the event of cardiac arrest. Patient initially presented from his home to [facility] in [place] and subsequently transferred to [place]. Reportedly increased generalized weakness and difficulty standing from seated position from his baseline as well as slurred speech since this morning when he woke up. Last known normal was 2100 yesterday. Hypertensive upon initial presentation BP 230s/150s, was given Hydralazine 20mg x 1. CT head wo contrast consistent with generalized atrophy, chronic appearing small vessel ischemic changes, and 6.1 mm R parietal hyperdense area labelled as calcification vs cortical hemorrhage. CT chest negative for pneumothorax.      Following transfer to [facility], BP noted to be in 140s, however all VSS otherwise and afebrile. GCS 13. Neurosurgery was paged, recommended SBP goal <160 as unsure if this is a true hemorrhage. Patient was loaded on Keppra and given Labetalol 10mg x 1 in ED. Admitted to ICU for close monitoring. Seen by Palliative Medicine.     Downgraded to Hospital medicine services during the evening hours of 3/19.  Blood pressure in 130s systolic/70s diastolic.  Afebrile.  Stable on room air.  Continued on  "Keppra.    Therapy evaluations ordered .No therapy indicated for physical therapy.  Seen by Neurology.  No further recommendations.  Neurosurgery also signed off.  Palliative Medicine was consulted further goals discussion. Case Management was also consulted. Initial discussions were NH.     Patient cannot eat with the current mental status .Later Palliative reevaluated and discussed with family .WA ed on Hospice finally.      Pt was seen and examined on the day of discharge.    Vitals:  VITAL SIGNS: 24 HRS MIN & MAX LAST   No data recorded 97.2 °F (36.2 °C)   No data recorded (!) 148/82   No data recorded  68   No data recorded 18   No data recorded 95 %       Physical Exam:  General: In no acute distress, afebrile  Chest: Clear to auscultation bilaterally  Heart:  +S1, S2, normal rate  Abdomen: Soft, nontender, BS +  MSK: Warm, no lower extremity edema, no clubbing or cyanosis  Neurologic: Alert , does not follow commands, able to move all extremities spontaneously.    Procedures Performed:see full chart    Significant Diagnostic Studies: See Full reports for all details    No results for input(s): "WBC", "RBC", "HGB", "HCT", "MCV", "MCH", "MCHC", "RDW", "PLT", "MPV", "GRAN", "LYMPH", "MONO", "BASO", "NRBC" in the last 168 hours.    No results for input(s): "NA", "K", "CL", "CO2", "ANIONGAP", "BUN", "CREATININE", "GLU", "CALCIUM", "PH", "MG", "ALBUMIN", "PROT", "ALKPHOS", "ALT", "AST", "BILITOT" in the last 168 hours.     Microbiology Results (last 7 days)       ** No results found for the last 168 hours. **             CT Head Without Contrast  Narrative: EXAMINATION:  CT HEAD WITHOUT CONTRAST    CLINICAL HISTORY:  cortical hemorrhage versus calcification;    TECHNIQUE:  Axial scans were obtained from skull base to the vertex.    Coronal and sagittal reconstructions obtained from the axial data.    Automatic exposure control was utilized to limit radiation dose.    Contrast: None    Radiation Dose:    Total DLP: " 18 80 mGy*cm    COMPARISON:  Outside CT head dated 03/18/2025    FINDINGS:  There is stable trace scattered subarachnoid hemorrhage over the right cerebral hemisphere.  Patchy hypodensities in the subcortical and periventricular white matter likely represent chronic ischemic changes.    There is no significant mass effect or midline shift.  The basal cisterns are patent.  There is diffuse parenchymal volume loss.  The calvarium appears intact.  Impression: Stable trace scattered subarachnoid hemorrhage over the right cerebral hemisphere.    Electronically signed by: Xenia Linton  Date:    03/19/2025  Time:    10:43         Medication List        START taking these medications      cloNIDine 0.3 mg/24 hr td ptwk 0.3 mg/24 hr  Commonly known as: CATAPRES  Place 1 patch onto the skin every 7 days.     D5W PgBk 100 mL with levETIRAcetam 500 mg/5 mL Soln 500 mg  Inject 500 mg into the vein every 12 (twelve) hours.     glycopyrrolate 0.2 mg/mL injection  Commonly known as: ROBINUL  Inject 0.5 mLs (0.1 mg total) into the vein 3 (three) times daily as needed (secretions).            CONTINUE taking these medications      amLODIPine 5 MG tablet  Commonly known as: NORVASC     atorvastatin 80 MG tablet  Commonly known as: LIPITOR     citalopram 10 MG tablet  Commonly known as: CeleXA     donepeziL 10 MG tablet  Commonly known as: ARICEPT     FeroSuL 325 mg (65 mg iron) Tab tablet  Generic drug: ferrous sulfate     levothyroxine 50 MCG tablet  Commonly known as: SYNTHROID     loratadine 10 mg tablet  Commonly known as: CLARITIN     montelukast 10 mg tablet  Commonly known as: SINGULAIR     potassium chloride SA 20 MEQ tablet  Commonly known as: K-DUR,KLOR-CON     QUEtiapine 50 MG tablet  Commonly known as: SEROQUEL     terazosin 10 MG capsule  Commonly known as: HYTRIN            STOP taking these medications      clopidogreL 75 mg tablet  Commonly known as: PLAVIX     furosemide 40 MG tablet  Commonly known as: LASIX      metoprolol tartrate 50 MG tablet  Commonly known as: LOPRESSOR     rivaroxaban 20 mg Tab  Commonly known as: XARELTO            ASK your doctor about these medications      HYDROcodone-acetaminophen  mg per tablet  Commonly known as: NORCO  Ask about: Should I take this medication?               Where to Get Your Medications        Information about where to get these medications is not yet available    Ask your nurse or doctor about these medications  cloNIDine 0.3 mg/24 hr td ptwk 0.3 mg/24 hr  D5W PgBk 100 mL with levETIRAcetam 500 mg/5 mL Soln 500 mg  glycopyrrolate 0.2 mg/mL injection          Explained in detail to the patient about the discharge plan, medications, and follow-up visits. Pt understands and agrees with the treatment plan  Discharge Disposition: Hospice/Medical Facility   Discharged Condition: stable  Diet-    Medications Per DC med rec  Activities as tolerated    For further questions contact hospitalist office    Discharge time 33 minutes    For worsening symptoms, chest pain, shortness of breath, increased abdominal pain, high grade fever, stroke or stroke like symptoms, immediately go to the nearest Emergency Room or call 911 as soon as possible.      Rossy Rodriguez M.D on 3/30/2025. at 10:11 AM.

## 2025-03-30 NOTE — PHYSICIAN QUERY
Please specify the diagnosis or diagnoses that correspond(s) to the indicators in the query message:  Other cardiovascular condition (please specify): Uncontrolled BP